# Patient Record
Sex: MALE | Race: OTHER | URBAN - METROPOLITAN AREA
[De-identification: names, ages, dates, MRNs, and addresses within clinical notes are randomized per-mention and may not be internally consistent; named-entity substitution may affect disease eponyms.]

---

## 2017-01-12 ENCOUNTER — OFFICE VISIT (OUTPATIENT)
Dept: OTOLARYNGOLOGY | Facility: CLINIC | Age: 37
End: 2017-01-12

## 2017-01-12 VITALS
BODY MASS INDEX: 19.7 KG/M2 | TEMPERATURE: 98 F | DIASTOLIC BLOOD PRESSURE: 86 MMHG | WEIGHT: 130 LBS | HEIGHT: 68 IN | SYSTOLIC BLOOD PRESSURE: 130 MMHG

## 2017-01-12 DIAGNOSIS — R42 DIZZINESS: Primary | ICD-10-CM

## 2017-01-12 PROCEDURE — 99212 OFFICE O/P EST SF 10 MIN: CPT | Performed by: OTOLARYNGOLOGY

## 2017-01-12 PROCEDURE — 99213 OFFICE O/P EST LOW 20 MIN: CPT | Performed by: OTOLARYNGOLOGY

## 2017-01-12 NOTE — PROGRESS NOTES
Savannah Springer is a 29year old male. Patient presents with:  Consult: imbalance for 5 years, worse in the last year     HPI:   He had dizziness abou 1-1/2 years ago. he had a VNG seem to demonstrate central process.   He had an MRI which was essentially no Right: Normal, Left: Normal.    Ears Normal Inspection - Right: Normal, Left: Normal. Canal - Left: Normal. TM - Right: Normal, Left: Normal.     ASSESSMENT AND PLAN:   1. Dizziness  Continued issues with balance. VNG showed central process.  Recommend neur

## 2017-01-12 NOTE — PATIENT INSTRUCTIONS
Dizziness (Vertigo) and Balance Problems: Diagnostic Tests    An otolaryngologist (also called an ENT) is a doctor who specializes in disorders of the ear, nose, and throat. Your ENT can help find clues to the cause of your dizziness.  He or she will exam

## 2017-05-04 ENCOUNTER — TELEPHONE (OUTPATIENT)
Dept: INTERNAL MEDICINE CLINIC | Facility: CLINIC | Age: 37
End: 2017-05-04

## 2017-05-10 NOTE — TELEPHONE ENCOUNTER
Not sure when last labs were done -- noted he was seen July 2016 -- not sure if he gets it from outside like [facility] -- will cc dr Pam Westbrook as he will be back next week and pts apt not until aug   Thank you

## 2017-06-03 ENCOUNTER — TELEPHONE (OUTPATIENT)
Dept: INTERNAL MEDICINE CLINIC | Facility: CLINIC | Age: 37
End: 2017-06-03

## 2017-06-03 DIAGNOSIS — Z00.00 PHYSICAL EXAM, ANNUAL: Primary | ICD-10-CM

## 2017-06-07 NOTE — TELEPHONE ENCOUNTER
Pt states he has used out side lab, and will send in older results. Pt advised orders have been approved.

## 2017-06-13 NOTE — TELEPHONE ENCOUNTER
Contacted pt and informed him that labs have been provided form him by [facility]. He stts that he usually gets labs done from [facility] that will draw labs at his workplace.

## 2017-07-25 ENCOUNTER — LAB ENCOUNTER (OUTPATIENT)
Dept: LAB | Age: 37
End: 2017-07-25
Attending: INTERNAL MEDICINE
Payer: COMMERCIAL

## 2017-07-25 DIAGNOSIS — Z00.00 PHYSICAL EXAM, ANNUAL: ICD-10-CM

## 2017-07-25 LAB
ALBUMIN SERPL BCP-MCNC: 4.6 G/DL (ref 3.5–4.8)
ALBUMIN/GLOB SERPL: 1.9 {RATIO} (ref 1–2)
ALP SERPL-CCNC: 37 U/L (ref 32–100)
ALT SERPL-CCNC: 16 U/L (ref 17–63)
ANION GAP SERPL CALC-SCNC: 6 MMOL/L (ref 0–18)
AST SERPL-CCNC: 19 U/L (ref 15–41)
BASOPHILS # BLD: 0.1 K/UL (ref 0–0.2)
BASOPHILS NFR BLD: 1 %
BILIRUB SERPL-MCNC: 0.6 MG/DL (ref 0.3–1.2)
BILIRUB UR QL: NEGATIVE
BUN SERPL-MCNC: 12 MG/DL (ref 8–20)
BUN/CREAT SERPL: 13 (ref 10–20)
CALCIUM SERPL-MCNC: 9.5 MG/DL (ref 8.5–10.5)
CHLORIDE SERPL-SCNC: 105 MMOL/L (ref 95–110)
CHOLEST SERPL-MCNC: 189 MG/DL (ref 110–200)
CLARITY UR: CLEAR
CO2 SERPL-SCNC: 28 MMOL/L (ref 22–32)
COLOR UR: YELLOW
CREAT SERPL-MCNC: 0.92 MG/DL (ref 0.5–1.5)
EOSINOPHIL # BLD: 0.1 K/UL (ref 0–0.7)
EOSINOPHIL NFR BLD: 3 %
ERYTHROCYTE [DISTWIDTH] IN BLOOD BY AUTOMATED COUNT: 13.2 % (ref 11–15)
GLOBULIN PLAS-MCNC: 2.4 G/DL (ref 2.5–3.7)
GLUCOSE SERPL-MCNC: 104 MG/DL (ref 70–99)
GLUCOSE UR-MCNC: NEGATIVE MG/DL
HCT VFR BLD AUTO: 44.2 % (ref 41–52)
HDLC SERPL-MCNC: 65 MG/DL
HGB BLD-MCNC: 15.1 G/DL (ref 13.5–17.5)
HGB UR QL STRIP.AUTO: NEGATIVE
KETONES UR-MCNC: NEGATIVE MG/DL
LDLC SERPL CALC-MCNC: 110 MG/DL (ref 0–99)
LEUKOCYTE ESTERASE UR QL STRIP.AUTO: NEGATIVE
LYMPHOCYTES # BLD: 1.8 K/UL (ref 1–4)
LYMPHOCYTES NFR BLD: 43 %
MCH RBC QN AUTO: 30.7 PG (ref 27–32)
MCHC RBC AUTO-ENTMCNC: 34.2 G/DL (ref 32–37)
MCV RBC AUTO: 89.8 FL (ref 80–100)
MONOCYTES # BLD: 0.3 K/UL (ref 0–1)
MONOCYTES NFR BLD: 8 %
NEUTROPHILS # BLD AUTO: 1.9 K/UL (ref 1.8–7.7)
NEUTROPHILS NFR BLD: 45 %
NITRITE UR QL STRIP.AUTO: NEGATIVE
NONHDLC SERPL-MCNC: 124 MG/DL
OSMOLALITY UR CALC.SUM OF ELEC: 288 MOSM/KG (ref 275–295)
PH UR: 6 [PH] (ref 5–8)
PLATELET # BLD AUTO: 197 K/UL (ref 140–400)
PMV BLD AUTO: 8.1 FL (ref 7.4–10.3)
POTASSIUM SERPL-SCNC: 4.3 MMOL/L (ref 3.3–5.1)
PROT SERPL-MCNC: 7 G/DL (ref 5.9–8.4)
PROT UR-MCNC: NEGATIVE MG/DL
RBC # BLD AUTO: 4.92 M/UL (ref 4.5–5.9)
SODIUM SERPL-SCNC: 139 MMOL/L (ref 136–144)
SP GR UR STRIP: 1.01 (ref 1–1.03)
TRIGL SERPL-MCNC: 72 MG/DL (ref 1–149)
TSH SERPL-ACNC: 1.39 UIU/ML (ref 0.45–5.33)
UROBILINOGEN UR STRIP-ACNC: <2
VIT C UR-MCNC: NEGATIVE MG/DL
WBC # BLD AUTO: 4.2 K/UL (ref 4–11)

## 2017-07-25 PROCEDURE — 81003 URINALYSIS AUTO W/O SCOPE: CPT

## 2017-07-25 PROCEDURE — 80061 LIPID PANEL: CPT

## 2017-07-25 PROCEDURE — 85025 COMPLETE CBC W/AUTO DIFF WBC: CPT

## 2017-07-25 PROCEDURE — 80053 COMPREHEN METABOLIC PANEL: CPT

## 2017-07-25 PROCEDURE — 36415 COLL VENOUS BLD VENIPUNCTURE: CPT

## 2017-07-25 PROCEDURE — 84443 ASSAY THYROID STIM HORMONE: CPT

## 2017-08-09 ENCOUNTER — OFFICE VISIT (OUTPATIENT)
Dept: INTERNAL MEDICINE CLINIC | Facility: CLINIC | Age: 37
End: 2017-08-09

## 2017-08-09 VITALS
WEIGHT: 129.5 LBS | HEART RATE: 53 BPM | SYSTOLIC BLOOD PRESSURE: 127 MMHG | DIASTOLIC BLOOD PRESSURE: 80 MMHG | HEIGHT: 68 IN | BODY MASS INDEX: 19.63 KG/M2

## 2017-08-09 DIAGNOSIS — Z00.00 PHYSICAL EXAM, ANNUAL: Primary | ICD-10-CM

## 2017-08-09 PROCEDURE — 99395 PREV VISIT EST AGE 18-39: CPT | Performed by: INTERNAL MEDICINE

## 2017-08-09 RX ORDER — SILDENAFIL 100 MG/1
100 TABLET, FILM COATED ORAL AS NEEDED
Qty: 10 TABLET | Refills: 1 | Status: SHIPPED | OUTPATIENT
Start: 2017-08-09 | End: 2017-08-16

## 2017-08-09 NOTE — PROGRESS NOTES
Delano Hodges is a 40year old male who presents for a complete physical exam.   HPI:   Pt complains of nothing. There is no immunization history on file for this patient.   Wt Readings from Last 6 Encounters:  08/09/17 : 129 lb 8 oz (58.7 kg)  01/12 abdominal pain,denies heartburn  : denies nocturia or changes in stream  MUSCULOSKELETAL: denies back pain  NEURO: denies headaches  PSYCHE: denies depression or anxiety  HEMATOLOGIC: denies hx of anemia  ENDOCRINE: denies thyroid history  ALL/ASTHMA: de

## 2017-08-12 ENCOUNTER — PATIENT MESSAGE (OUTPATIENT)
Dept: INTERNAL MEDICINE CLINIC | Facility: CLINIC | Age: 37
End: 2017-08-12

## 2017-08-16 RX ORDER — SILDENAFIL 100 MG/1
100 TABLET, FILM COATED ORAL
Qty: 40 TABLET | Refills: 0 | Status: SHIPPED | OUTPATIENT
Start: 2017-08-16 | End: 2018-10-03

## 2017-08-16 NOTE — PROGRESS NOTES
From: Catia Suarez  To: Janet Jaeger MD  Sent: 8/12/2017 4:32 PM CDT  Subject: Prescription Question    Dr. Moriah Lizama,    You wrote me a prescription when I saw you earlier this week.  It was a prescription for 10 tablets of 100mg Viagra with 1 r

## 2017-08-25 ENCOUNTER — TELEPHONE (OUTPATIENT)
Dept: FAMILY MEDICINE CLINIC | Facility: CLINIC | Age: 37
End: 2017-08-25

## 2018-10-03 ENCOUNTER — TELEPHONE (OUTPATIENT)
Dept: INTERNAL MEDICINE CLINIC | Facility: CLINIC | Age: 38
End: 2018-10-03

## 2018-10-03 ENCOUNTER — OFFICE VISIT (OUTPATIENT)
Dept: INTERNAL MEDICINE CLINIC | Facility: CLINIC | Age: 38
End: 2018-10-03
Payer: COMMERCIAL

## 2018-10-03 VITALS
SYSTOLIC BLOOD PRESSURE: 144 MMHG | HEART RATE: 76 BPM | DIASTOLIC BLOOD PRESSURE: 99 MMHG | WEIGHT: 130 LBS | BODY MASS INDEX: 19.7 KG/M2 | HEIGHT: 68 IN | RESPIRATION RATE: 16 BRPM

## 2018-10-03 DIAGNOSIS — Z00.00 PHYSICAL EXAM, ANNUAL: Primary | ICD-10-CM

## 2018-10-03 PROCEDURE — 99395 PREV VISIT EST AGE 18-39: CPT | Performed by: INTERNAL MEDICINE

## 2018-10-03 NOTE — PROGRESS NOTES
Allen Enriquez is a 41year old male who presents for a complete physical exam.   HPI:   Pt complains of nothing. 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 Is having testing done.      Immunization History  Administered            Date(s) Administered    Influenza lesions  EYES:denies blurred vision or double vision  HEENT: denies nasal congestion, sinus pain or ST  LUNGS: denies shortness of breath with exertion  CARDIOVASCULAR: denies chest pain on exertion  GI: denies abdominal pain,denies heartburn  : denies n

## 2018-10-03 NOTE — TELEPHONE ENCOUNTER
Pt called in stating that his package was delivered on 9/28 and signed by Wong Malik. I contacted the  who advised that TOM Reyjuan miguel Gagnononeil is not in the office but will return tomorrow. Please advise pt where he can  his package.

## 2018-10-05 RX ORDER — SILDENAFIL 100 MG/1
100 TABLET, FILM COATED ORAL
Qty: 40 TABLET | Refills: 0 | Status: SHIPPED | OUTPATIENT
Start: 2018-10-05 | End: 2018-10-15

## 2018-10-05 NOTE — TELEPHONE ENCOUNTER
Please see patient's message below and advise    Med pended for script printed    Please respond to pool: EM [facility] LPN/CMA    Office staff, please call patient when script ready for       To: EM RN TRIAGE      From: Bigg Benitez      Created: 10

## 2018-10-15 ENCOUNTER — TELEPHONE (OUTPATIENT)
Dept: INTERNAL MEDICINE CLINIC | Facility: CLINIC | Age: 38
End: 2018-10-15

## 2018-10-15 RX ORDER — SILDENAFIL 100 MG/1
100 TABLET, FILM COATED ORAL
Qty: 40 TABLET | Refills: 0 | Status: SHIPPED | OUTPATIENT
Start: 2018-10-15 | End: 2018-10-15

## 2018-10-15 RX ORDER — SILDENAFIL 100 MG/1
100 TABLET, FILM COATED ORAL
Qty: 40 TABLET | Refills: 0 | Status: SHIPPED | OUTPATIENT
Start: 2018-10-15 | End: 2019-08-20

## 2019-08-20 ENCOUNTER — OFFICE VISIT (OUTPATIENT)
Dept: INTERNAL MEDICINE CLINIC | Facility: CLINIC | Age: 39
End: 2019-08-20
Payer: COMMERCIAL

## 2019-08-20 VITALS
SYSTOLIC BLOOD PRESSURE: 134 MMHG | RESPIRATION RATE: 16 BRPM | DIASTOLIC BLOOD PRESSURE: 86 MMHG | HEIGHT: 68 IN | WEIGHT: 130 LBS | BODY MASS INDEX: 19.7 KG/M2 | HEART RATE: 52 BPM

## 2019-08-20 DIAGNOSIS — Z00.00 PHYSICAL EXAM, ANNUAL: Primary | ICD-10-CM

## 2019-08-20 DIAGNOSIS — G47.19 EXCESSIVE DAYTIME SLEEPINESS: ICD-10-CM

## 2019-08-20 DIAGNOSIS — R06.83 HABITUAL SNORING: ICD-10-CM

## 2019-08-20 PROCEDURE — 99395 PREV VISIT EST AGE 18-39: CPT | Performed by: INTERNAL MEDICINE

## 2019-08-20 RX ORDER — SILDENAFIL 100 MG/1
100 TABLET, FILM COATED ORAL
Qty: 40 TABLET | Refills: 0 | Status: SHIPPED | OUTPATIENT
Start: 2019-08-20 | End: 2021-05-19

## 2019-10-21 ENCOUNTER — PATIENT MESSAGE (OUTPATIENT)
Dept: INTERNAL MEDICINE CLINIC | Facility: CLINIC | Age: 39
End: 2019-10-21

## 2019-10-21 DIAGNOSIS — H00.11 CHALAZION OF RIGHT UPPER EYELID: Primary | ICD-10-CM

## 2019-10-22 ENCOUNTER — OFFICE VISIT (OUTPATIENT)
Dept: SLEEP CENTER | Age: 39
End: 2019-10-22
Attending: INTERNAL MEDICINE
Payer: COMMERCIAL

## 2019-10-22 DIAGNOSIS — G47.33 OSA (OBSTRUCTIVE SLEEP APNEA): Primary | ICD-10-CM

## 2019-10-22 PROCEDURE — 95806 SLEEP STUDY UNATT&RESP EFFT: CPT

## 2019-10-22 NOTE — TELEPHONE ENCOUNTER
From: Yohannes Elaine  To: Sharon Green MD  Sent: 10/21/2019 3:45 PM CDT  Subject: Referral Request    I have a chalazion which we discussed at my annual physical back in August. I’d like to see an opthamologist to get it addressed.  Can you refer m

## 2019-10-24 ENCOUNTER — OFFICE VISIT (OUTPATIENT)
Dept: OPHTHALMOLOGY | Facility: CLINIC | Age: 39
End: 2019-10-24
Payer: COMMERCIAL

## 2019-10-24 DIAGNOSIS — H00.14 CHALAZION OF LEFT UPPER EYELID: ICD-10-CM

## 2019-10-24 DIAGNOSIS — H00.11 CHALAZION RIGHT UPPER EYELID: Primary | ICD-10-CM

## 2019-10-24 PROCEDURE — 99212 OFFICE O/P EST SF 10 MIN: CPT | Performed by: OPHTHALMOLOGY

## 2019-10-24 PROCEDURE — 99202 OFFICE O/P NEW SF 15 MIN: CPT | Performed by: OPHTHALMOLOGY

## 2019-10-24 NOTE — PROGRESS NOTES
Vee Reynoso is a 46year old male. HPI:     HPI     Consult      Additional comments: Per Dr. Soco Kohli               Comments     Pt complains of multiple chalazia one on his his right upper lid and one on his left upper lid since June 2019.    Pt has b Acuity (Snellen - Linear)       Right Left    Dist sc 20/20 20/20 -2    Near sc 20/20 20/20          Pupils       Pupils    Right PERRL    Left PERRL            Slit Lamp and Fundus Exam     Slit Lamp Exam       Right Left    Lids/Lashes large chalazion RU

## 2019-10-24 NOTE — PATIENT INSTRUCTIONS
Chalazion right upper eyelid   Diagnosis discussed with patient. Chalazion info given. Told patient to use warm compresses 3-4 times per hour to try and have chalazion to open and drain.   Told patient that since this has been there for some time, it mo · Apply a warm, moist towel or compress for 10 to 15 minutes, 3 to 4 times a day. This will reduce the swelling and soften the hardened oils blocking the duct. · Massage the area gently after applying the warm compress to help drain the chalazion.  Or foll © 3395-2460 [employer] [address]. [address] [facility], [facility] [place]. All rights reserved. This information is not intended as a substitute for professional medical care. Always follow your healthcare professional's instructions.

## 2019-10-25 ENCOUNTER — PATIENT MESSAGE (OUTPATIENT)
Dept: INTERNAL MEDICINE CLINIC | Facility: CLINIC | Age: 39
End: 2019-10-25

## 2019-10-25 NOTE — TELEPHONE ENCOUNTER
[employer] message sent. From: Maura Goetz  To: Rudy Norris MD  Sent: 10/25/2019  7:46 AM CDT  Subject: Test Results Question    I had a sleep study earlier this week. Did you receive results?   How and when would you like to go over the resu

## 2019-10-25 NOTE — TELEPHONE ENCOUNTER
MyChart message sent. See other TE MyChart for the referral, already signed by Ciera Polanco.       From: Anthony Montana  To: Cass Heart MD  Sent: 10/25/2019  7:49 AM CDT  Subject: Referral Request    I saw Dr. Fontanez  per a previous referral. I

## 2019-10-28 NOTE — PROCEDURES
[facility]  Accredited by the [place] of Sleep Medicine (AASM)    PATIENT'S NAME: Kallie Teri: Claribel Dowling MD   REFERRING PHYSICIAN:    PATIENT ACCOUNT #: [de-identified] LOCATION: [address] [place] [address] position, equivalent to 40% of the testing. The average heart rate was 54 beats per minute. The maximum heart rate was 99 beats per minute.     INTERPRETATION:  The data generated from this study is consistent with mild obstructive sleep apnea (ICD-10 cod

## 2019-11-06 ENCOUNTER — OFFICE VISIT (OUTPATIENT)
Dept: OPHTHALMOLOGY | Facility: CLINIC | Age: 39
End: 2019-11-06
Payer: COMMERCIAL

## 2019-11-06 DIAGNOSIS — H00.11 CHALAZION RIGHT UPPER EYELID: Primary | ICD-10-CM

## 2019-11-06 PROCEDURE — 67800 REMOVE EYELID LESION: CPT | Performed by: OPHTHALMOLOGY

## 2019-11-06 NOTE — PATIENT INSTRUCTIONS
Chalazion right upper eyelid  Excision of right chalazion was done in the office today by Dr. MORGAN [facility] without complications. Erythromycin was applied and pressure patch was applied to the right eye  Patient can remove the pressure patch when he gets home.

## 2019-11-06 NOTE — PROGRESS NOTES
Parvez Osborn is a 46year old male. HPI:     HPI     Patient is here for a chalazion excision RUL.       Last edited by Casa Li OT on 11/6/2019  3:28 PM. (History)        Patient History:  Past Medical History:   Diagnosis Date   • Ankle fract No prescriptions requested or ordered in this encounter        Follow up instructions:  Return if symptoms worsen or fail to improve.     11/6/2019  Scribed by: Juan Carlos Sagastume MD

## 2019-11-06 NOTE — ASSESSMENT & PLAN NOTE
Excision of right chalazion was done in the office today by Dr. Sade Ortega without complications. Erythromycin was applied and pressure patch was applied to the right eye  Patient can remove the pressure patch when he gets home.   Patient should use cool comp

## 2020-01-22 ENCOUNTER — TELEPHONE (OUTPATIENT)
Dept: INTERNAL MEDICINE CLINIC | Facility: CLINIC | Age: 40
End: 2020-01-22

## 2020-01-22 NOTE — TELEPHONE ENCOUNTER
Patient requesting the order for the sleep study to be faxed to [phone] to [facility] center for sleep disorders, [address] in [place], [place],  please call when order has been faxed.

## 2020-01-23 NOTE — TELEPHONE ENCOUNTER
Delivery Read From Message Type Attachments Subject   1/22/2020  4:12 PM Karl Campos Patient Medical Advice Request  Sleep study order

## 2020-01-24 ENCOUNTER — PATIENT MESSAGE (OUTPATIENT)
Dept: INTERNAL MEDICINE CLINIC | Facility: CLINIC | Age: 40
End: 2020-01-24

## 2020-01-24 NOTE — TELEPHONE ENCOUNTER
From: Deysi Mejia  To: Alma Brantley MD  Sent: 1/24/2020 10:41 AM CST  Subject: Other    I'm sending a message regarding a sleep study and getting the needed information over to [facility] so that can schedule an appointment.      Could you ple

## 2020-01-24 NOTE — TELEPHONE ENCOUNTER
Clinical staff, can you please assist with mychart message?     Please respond to pool: EM IM [address] LPN/CMA

## 2020-01-30 ENCOUNTER — TELEPHONE (OUTPATIENT)
Dept: INTERNAL MEDICINE CLINIC | Facility: CLINIC | Age: 40
End: 2020-01-30

## 2020-01-30 NOTE — TELEPHONE ENCOUNTER
Cinthia from [facility] would like clinicals, sleep, and diagnostic  faxed over to # [phone].  Please advise

## 2020-01-31 ENCOUNTER — TELEPHONE (OUTPATIENT)
Dept: INTERNAL MEDICINE CLINIC | Facility: CLINIC | Age: 40
End: 2020-01-31

## 2020-01-31 DIAGNOSIS — G47.33 OSA (OBSTRUCTIVE SLEEP APNEA): Primary | ICD-10-CM

## 2020-01-31 NOTE — TELEPHONE ENCOUNTER
Deisi, patient is requesting sleep study to be entered as external since he is planning on completing through [address] [facility].      Please reply to pool: EM MANAGED CARE - MAIN

## 2020-01-31 NOTE — TELEPHONE ENCOUNTER
[facility] walked in with order from [facility]. Patients order indicates [id number] [address], please call at:[phone],thanks.

## 2020-01-31 NOTE — TELEPHONE ENCOUNTER
Titration study ordered by PCP's PA, Deisi Galindo. Order will need to be edited to \"external\" facility if pt is going through [address] [place] [address]

## 2020-02-21 ENCOUNTER — MED REC SCAN ONLY (OUTPATIENT)
Dept: INTERNAL MEDICINE CLINIC | Facility: CLINIC | Age: 40
End: 2020-02-21

## 2020-02-26 ENCOUNTER — TELEPHONE (OUTPATIENT)
Dept: INTERNAL MEDICINE CLINIC | Facility: CLINIC | Age: 40
End: 2020-02-26

## 2020-02-26 DIAGNOSIS — G47.33 OSA (OBSTRUCTIVE SLEEP APNEA): Primary | ICD-10-CM

## 2020-02-26 NOTE — TELEPHONE ENCOUNTER
Results received from [facility] for sleep study. Trial of a CPAP on 6cm H2O with a medium size Resmed full face mask mirage quattro mask and heated humidification recommended by physician.  He is advised to return to the sleep center 4 weeks after therapy for r

## 2020-02-28 NOTE — TELEPHONE ENCOUNTER
NATALIE Galindo can you please fax results on over to me so I can submit to [facility] [phone]. Thank you.     Please reply to pool: EM TRIAGE SUPPORT

## 2020-02-28 NOTE — TELEPHONE ENCOUNTER
Spoke with patient ( verified) and relayed GREG Galindo's message below--patient verbalizes understanding and agreement and will await further instruction RE DME approval and delivery. No further questions/concerns at this time.     Routed to triage support

## 2020-03-02 ENCOUNTER — MED REC SCAN ONLY (OUTPATIENT)
Dept: INTERNAL MEDICINE CLINIC | Facility: CLINIC | Age: 40
End: 2020-03-02

## 2020-03-04 ENCOUNTER — PATIENT MESSAGE (OUTPATIENT)
Dept: INTERNAL MEDICINE CLINIC | Facility: CLINIC | Age: 40
End: 2020-03-04

## 2020-03-05 ENCOUNTER — TELEPHONE (OUTPATIENT)
Dept: INTERNAL MEDICINE CLINIC | Facility: CLINIC | Age: 40
End: 2020-03-05

## 2020-03-05 NOTE — TELEPHONE ENCOUNTER
Trevor Quiroz from [facility] is calling because they need a second diagnosis added into notes, patient's AHI is only 5.9 and his insurance requires a second diagnosis if under 14.  The second diagnosis you can choose from is:    Excessive daytime sleepiness  Impaired cognition  Insomnia  Ischemic heart disease  History of stroke

## 2020-03-06 NOTE — TELEPHONE ENCOUNTER
Can we regenerate a DME order for this and add both YUMIKO and excessive daytime drowsiness and the diagnosis codes. I did speak to patient today and he does have daytime drowsiness.

## 2020-03-06 NOTE — TELEPHONE ENCOUNTER
Dr. Zahra Sepulveda, office notes from 08/20/2019 need to state additional diagnosis per Michael Morton at [facility]. Please addend notes. See message below.      Please reply to pool: EM TRIAGE SUPPORT

## 2020-03-12 NOTE — TELEPHONE ENCOUNTER
Cindy Baker MD 2 days ago         We spoke on Friday and you had asked me a few follow up questions about my CPAP order for insurance approval I believe. Do you have an update?           KIM Munoz Taher 8 days

## 2020-03-12 NOTE — TELEPHONE ENCOUNTER
From: Marcus Pina  To: Minerva Jewell MD  Sent: 3/4/2020 1:19 PM CST  Subject: Non-Urgent Medical Question    I'm following up about the recent sleep study I did at [facility] and the subsequent order for a CPAP machine. I do already have a mask.  I

## 2020-07-14 ENCOUNTER — OFFICE VISIT (OUTPATIENT)
Dept: PULMONOLOGY | Facility: CLINIC | Age: 40
End: 2020-07-14
Payer: COMMERCIAL

## 2020-07-14 VITALS
BODY MASS INDEX: 19.4 KG/M2 | WEIGHT: 131 LBS | DIASTOLIC BLOOD PRESSURE: 90 MMHG | HEIGHT: 69 IN | OXYGEN SATURATION: 98 % | SYSTOLIC BLOOD PRESSURE: 131 MMHG | HEART RATE: 55 BPM | RESPIRATION RATE: 18 BRPM

## 2020-07-14 DIAGNOSIS — G47.33 OSA (OBSTRUCTIVE SLEEP APNEA): Primary | ICD-10-CM

## 2020-07-14 PROCEDURE — 99204 OFFICE O/P NEW MOD 45 MIN: CPT | Performed by: INTERNAL MEDICINE

## 2020-07-14 PROCEDURE — 99212 OFFICE O/P EST SF 10 MIN: CPT | Performed by: INTERNAL MEDICINE

## 2020-07-14 NOTE — H&P
Referring Physician  Rudolph Lai MD    Chief Complaint  Sleep apnea    History of Present Illness  Patient is a 71-year-old male who presents from clinic for initial visit.   Patient with underlying history of recent polysomnography revealing evide chest imaging available to review    Pulmonary Function Testing  None    Assessment  1. Mild obstructive sleep apnea    Plan  -Patient seen today for management of underlying obstructive sleep apnea.   I reviewed his polysomnography results with initial di

## 2020-07-22 ENCOUNTER — TELEPHONE (OUTPATIENT)
Dept: PULMONOLOGY | Facility: CLINIC | Age: 40
End: 2020-07-22

## 2020-07-22 NOTE — TELEPHONE ENCOUNTER
Called [facility] [phone] spoke with Oseas Keyes prior auth for dme cpap supplies need cpt codes for prior auth     Please provide cpap cpt codes to initiate auth. Regarding- ref# [id number]    Process will take 14 business days.  She did state E is in network

## 2020-07-24 NOTE — TELEPHONE ENCOUNTER
Blanca- referral [id number] is for a change in CPAP pressure. If you need a CPT code for this referral, please reach out to Maciel Bartholomew. Our office does not supply CPT codes for HME. Their phone # is [phone]. Thanks.

## 2020-07-27 NOTE — TELEPHONE ENCOUNTER
Seward Goodell, RN; [facility] Clinical [facility]   Caller: Unspecified (5 days ago,  8:04 AM)             Good Morning,     Thanks for your response,     I will contact [facility] for cpt code and update referral once received.      [email or web address]

## 2020-07-29 NOTE — TELEPHONE ENCOUNTER
Called HME - for code- no code required- No authorization required per Africa Leonardo at [place] [id number] they will just make a service call to patient to change setting even though [facility] hmo plan.     [facility]

## 2020-08-02 ENCOUNTER — PATIENT MESSAGE (OUTPATIENT)
Dept: PULMONOLOGY | Facility: CLINIC | Age: 40
End: 2020-08-02

## 2020-08-02 DIAGNOSIS — G47.33 OSA (OBSTRUCTIVE SLEEP APNEA): Primary | ICD-10-CM

## 2020-08-03 NOTE — TELEPHONE ENCOUNTER
Please see Pt's MyChart message and advise. Thanks      From: Loki Mckeon  To:  Elsie Aguilar DO  Sent: 8/2/2020 11:42 AM CDT  Subject: Non-Urgent Medical Question    I have been using my CPAP for a little over 2 weeks since our consultation in July

## 2020-08-07 NOTE — TELEPHONE ENCOUNTER
Patient agreeable with plan to increase pressure setting. I have pended a DME order for the change. Please send to staff once sign and we can fax the order to DME. Thanks.

## 2020-08-11 ENCOUNTER — TELEPHONE (OUTPATIENT)
Dept: PULMONOLOGY | Facility: CLINIC | Age: 40
End: 2020-08-11

## 2020-08-11 NOTE — TELEPHONE ENCOUNTER
[facility] and MyChart message sent to patient. Fax confirmation received.

## 2020-08-11 NOTE — TELEPHONE ENCOUNTER
Left message to informed pt that DME order was made anf faxed to E. Provided HME phone nb. Fax confirmation received.

## 2020-09-01 ENCOUNTER — OFFICE VISIT (OUTPATIENT)
Dept: INTERNAL MEDICINE CLINIC | Facility: CLINIC | Age: 40
End: 2020-09-01
Payer: COMMERCIAL

## 2020-09-01 VITALS
HEART RATE: 48 BPM | SYSTOLIC BLOOD PRESSURE: 136 MMHG | HEIGHT: 68 IN | RESPIRATION RATE: 16 BRPM | DIASTOLIC BLOOD PRESSURE: 89 MMHG | WEIGHT: 130 LBS | BODY MASS INDEX: 19.7 KG/M2

## 2020-09-01 DIAGNOSIS — Z00.00 PHYSICAL EXAM, ANNUAL: Primary | ICD-10-CM

## 2020-09-01 DIAGNOSIS — G47.33 OBSTRUCTIVE SLEEP APNEA: ICD-10-CM

## 2020-09-01 PROCEDURE — 99395 PREV VISIT EST AGE 18-39: CPT | Performed by: INTERNAL MEDICINE

## 2020-09-01 PROCEDURE — 3008F BODY MASS INDEX DOCD: CPT | Performed by: INTERNAL MEDICINE

## 2020-09-01 PROCEDURE — 3079F DIAST BP 80-89 MM HG: CPT | Performed by: INTERNAL MEDICINE

## 2020-09-01 PROCEDURE — 3075F SYST BP GE 130 - 139MM HG: CPT | Performed by: INTERNAL MEDICINE

## 2020-09-01 NOTE — PROGRESS NOTES
Anthony Montana is a 45year old male who presents for a complete physical exam.   HPI:   Pt complains of less sleep apnea while wearring the CPAP machine.       Immunization History  Administered            Date(s) Administered    Influenza             11/ Smoker      Smokeless tobacco: Never Used    Alcohol use: No    Drug use: No     Occ:   : yes. Children: yes   Exercise: 6 times per week.   Diet: watches fats closely     REVIEW OF SYSTEMS:     GENERAL: feels well otherwise  SKIN: denies any Health maintenance, reviewed fasting Lipids, TSH, CMP and CBC . Labs are quite good. Pt referred for screening colonoscopy at 48 y/o.  Pt info discussed: exercise, low fat diet, testicular self exam and prostate cancer screening starting at 40 y/o.    2. O

## 2020-09-02 ENCOUNTER — MED REC SCAN ONLY (OUTPATIENT)
Dept: INTERNAL MEDICINE CLINIC | Facility: CLINIC | Age: 40
End: 2020-09-02

## 2020-09-17 ENCOUNTER — PATIENT MESSAGE (OUTPATIENT)
Dept: PULMONOLOGY | Facility: CLINIC | Age: 40
End: 2020-09-17

## 2020-09-18 NOTE — TELEPHONE ENCOUNTER
From: Adamaris Banda  To: Fabiola Bennett DO  Sent: 9/17/2020 11:18 AM CDT  Subject: Non-Urgent Medical Question    I'm sleeping better with the new pressure setting, but I have a very dry mouth in the morning. My CPAP has a humidifier.  The settings for

## 2020-12-16 ENCOUNTER — TELEPHONE (OUTPATIENT)
Dept: INTERNAL MEDICINE CLINIC | Facility: CLINIC | Age: 40
End: 2020-12-16

## 2020-12-16 NOTE — TELEPHONE ENCOUNTER
[employer] message sent to pt advising his to call office, under appt noted pt indicated \"cold feet\".

## 2020-12-16 NOTE — TELEPHONE ENCOUNTER
----- Message from Allen Enriquez sent at 12/16/2020  2:26 PM CST -----  Regarding: Non-Urgent Medical Question  Contact: [phone]  I scheduled a meeting with you next week in the office.  I'm realizing now that this may be an appropriate telemedicine

## 2020-12-23 ENCOUNTER — OFFICE VISIT (OUTPATIENT)
Dept: INTERNAL MEDICINE CLINIC | Facility: CLINIC | Age: 40
End: 2020-12-23
Payer: COMMERCIAL

## 2020-12-23 VITALS
SYSTOLIC BLOOD PRESSURE: 128 MMHG | HEART RATE: 53 BPM | HEIGHT: 68 IN | WEIGHT: 138 LBS | DIASTOLIC BLOOD PRESSURE: 74 MMHG | RESPIRATION RATE: 16 BRPM | BODY MASS INDEX: 20.92 KG/M2

## 2020-12-23 DIAGNOSIS — R20.9 BILATERAL COLD FEET: Primary | ICD-10-CM

## 2020-12-23 PROCEDURE — 3078F DIAST BP <80 MM HG: CPT | Performed by: INTERNAL MEDICINE

## 2020-12-23 PROCEDURE — 3008F BODY MASS INDEX DOCD: CPT | Performed by: INTERNAL MEDICINE

## 2020-12-23 PROCEDURE — 3074F SYST BP LT 130 MM HG: CPT | Performed by: INTERNAL MEDICINE

## 2020-12-23 PROCEDURE — 99213 OFFICE O/P EST LOW 20 MIN: CPT | Performed by: INTERNAL MEDICINE

## 2020-12-23 PROCEDURE — 99212 OFFICE O/P EST SF 10 MIN: CPT | Performed by: INTERNAL MEDICINE

## 2020-12-23 NOTE — PROGRESS NOTES
Delano Hodges is a 45year old male. HPI:     1. Bilateral cold feet    The patient complains of cold feet coming out of the last few months. He notes that he has to use either warm water or electric blanket to keep the feet warm.   He is able to jog 2 without bruit. ASSESSMENT AND PLAN:     1. Bilateral cold feet    There is no evidence of poor circulation on the patient.   His posterior tibial and dorsalis pedis pulses are normal.  His feet feel a little cold to touch but I think this is either mild

## 2021-05-19 ENCOUNTER — PATIENT MESSAGE (OUTPATIENT)
Dept: INTERNAL MEDICINE CLINIC | Facility: CLINIC | Age: 41
End: 2021-05-19

## 2021-05-19 ENCOUNTER — OFFICE VISIT (OUTPATIENT)
Dept: INTERNAL MEDICINE CLINIC | Facility: CLINIC | Age: 41
End: 2021-05-19
Payer: COMMERCIAL

## 2021-05-19 VITALS
SYSTOLIC BLOOD PRESSURE: 132 MMHG | WEIGHT: 134 LBS | BODY MASS INDEX: 20.31 KG/M2 | RESPIRATION RATE: 16 BRPM | DIASTOLIC BLOOD PRESSURE: 82 MMHG | HEIGHT: 68 IN

## 2021-05-19 DIAGNOSIS — Z00.00 PHYSICAL EXAM, ANNUAL: Primary | ICD-10-CM

## 2021-05-19 PROCEDURE — 99395 PREV VISIT EST AGE 18-39: CPT | Performed by: INTERNAL MEDICINE

## 2021-05-19 PROCEDURE — 3075F SYST BP GE 130 - 139MM HG: CPT | Performed by: INTERNAL MEDICINE

## 2021-05-19 PROCEDURE — 3079F DIAST BP 80-89 MM HG: CPT | Performed by: INTERNAL MEDICINE

## 2021-05-19 PROCEDURE — 3008F BODY MASS INDEX DOCD: CPT | Performed by: INTERNAL MEDICINE

## 2021-05-19 NOTE — TELEPHONE ENCOUNTER
Dr Todd Overton=see message below, pended script for printing. Office staff=please call patient once ready for .     From: Katherine Fox  To: Malini Le MD  Sent: 5/19/2021  1:16 PM CDT  Subject: Prescription Question    Good to see you

## 2021-05-19 NOTE — PROGRESS NOTES
Lenora Graves is a 45year old male who presents for a complete physical exam.   HPI:   Pt complains of less sleep apnea while wearring the CPAP machine.       Immunization History  Administered            Date(s) Administered    [employer] 3 Neg       Social History:  Social History    Tobacco Use      Smoking status: Never Smoker      Smokeless tobacco: Never Used    Alcohol use: No    Drug use: No     Occ:   : yes. Children: yes   Exercise: 6 times per week.   Diet: watches fats recommended low fat diet and aerobic exercise 30 minutes three times weekly. Health maintenance, reviewed fasting Lipids, TSH, CMP and CBC . Labs are quite good. Pt referred for screening colonoscopy at 50 y/o.  Pt info discussed: exercise, low fat diet, sade

## 2021-05-20 RX ORDER — SILDENAFIL 100 MG/1
100 TABLET, FILM COATED ORAL
Qty: 40 TABLET | Refills: 0 | Status: SHIPPED | OUTPATIENT
Start: 2021-05-20 | End: 2021-06-02

## 2021-09-02 ENCOUNTER — OFFICE VISIT (OUTPATIENT)
Dept: PULMONOLOGY | Facility: CLINIC | Age: 41
End: 2021-09-02
Payer: COMMERCIAL

## 2021-09-02 VITALS
BODY MASS INDEX: 19.85 KG/M2 | HEART RATE: 61 BPM | HEIGHT: 68 IN | WEIGHT: 131 LBS | OXYGEN SATURATION: 97 % | TEMPERATURE: 98 F | SYSTOLIC BLOOD PRESSURE: 127 MMHG | DIASTOLIC BLOOD PRESSURE: 81 MMHG | RESPIRATION RATE: 16 BRPM

## 2021-09-02 DIAGNOSIS — G47.33 OSA (OBSTRUCTIVE SLEEP APNEA): Primary | ICD-10-CM

## 2021-09-02 PROCEDURE — 99213 OFFICE O/P EST LOW 20 MIN: CPT | Performed by: INTERNAL MEDICINE

## 2021-09-02 PROCEDURE — 3079F DIAST BP 80-89 MM HG: CPT | Performed by: INTERNAL MEDICINE

## 2021-09-02 PROCEDURE — 3074F SYST BP LT 130 MM HG: CPT | Performed by: INTERNAL MEDICINE

## 2021-09-02 PROCEDURE — 3008F BODY MASS INDEX DOCD: CPT | Performed by: INTERNAL MEDICINE

## 2021-09-02 NOTE — PROGRESS NOTES
Referring Physician  Jennifer Schroeder MD    History of Present Illness  Patient seen today for follow-up visit for management of underlying obstructive sleep apnea. Patient using CPAP device on a nightly basis with improvement in hypersomnia and fatigue.

## 2021-12-06 ENCOUNTER — APPOINTMENT (OUTPATIENT)
Dept: URBAN - METROPOLITAN AREA CLINIC 321 | Age: 41
Setting detail: DERMATOLOGY
End: 2021-12-06

## 2021-12-06 DIAGNOSIS — L63.8 OTHER ALOPECIA AREATA: ICD-10-CM

## 2021-12-06 PROCEDURE — 99204 OFFICE O/P NEW MOD 45 MIN: CPT | Mod: 25

## 2021-12-06 PROCEDURE — OTHER COUNSELING: OTHER

## 2021-12-06 PROCEDURE — OTHER INTRALESIONAL KENALOG: OTHER

## 2021-12-06 PROCEDURE — OTHER PRESCRIPTION: OTHER

## 2021-12-06 PROCEDURE — 11900 INJECT SKIN LESIONS </W 7: CPT

## 2021-12-06 PROCEDURE — OTHER SEPARATE AND IDENTIFIABLE DOCUMENTATION: OTHER

## 2021-12-06 RX ORDER — TACROLIMUS 1 MG/G
OINTMENT TOPICAL
Qty: 60 | Refills: 1 | Status: ERX | COMMUNITY
Start: 2021-12-06

## 2021-12-06 ASSESSMENT — LOCATION DETAILED DESCRIPTION DERM
LOCATION DETAILED: RIGHT SUBMANDIBULAR AREA
LOCATION DETAILED: LEFT SUBMANDIBULAR AREA
LOCATION DETAILED: SUBMENTAL CHIN

## 2021-12-06 ASSESSMENT — LOCATION SIMPLE DESCRIPTION DERM
LOCATION SIMPLE: LEFT SUBMANDIBULAR AREA
LOCATION SIMPLE: SUBMENTAL CHIN
LOCATION SIMPLE: RIGHT SUBMANDIBULAR AREA

## 2021-12-06 ASSESSMENT — LOCATION ZONE DERM: LOCATION ZONE: FACE

## 2021-12-06 NOTE — PROCEDURE: INTRALESIONAL KENALOG
Consent: The risks of atrophy were reviewed with the patient. Discussed risk of hypopigmentation/skin thinning, as well as risk of infection, bleeding, scarring and need for repeat tx with pt.

## 2021-12-11 ENCOUNTER — PATIENT MESSAGE (OUTPATIENT)
Dept: PULMONOLOGY | Facility: CLINIC | Age: 41
End: 2021-12-11

## 2021-12-15 NOTE — TELEPHONE ENCOUNTER
patient states he don't like the cpap and there is no significant difference in his sleep with using the CPAP just the snoring. He wants recommendations in exploring other options. Please advise.

## 2021-12-15 NOTE — TELEPHONE ENCOUNTER
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 I took a couple weeks break. I don’t feel tired, or really any different than with the CPAP.  I feel l

## 2021-12-16 NOTE — TELEPHONE ENCOUNTER
Binta Rao, DO  [facility] Clinical Staff 4 hours ago (11:46 AM)         You may let the patient know other treatment options would include ENT evaluation to see if surgery may be an option versus being seen by dentist for an oral appliance which he

## 2021-12-21 ENCOUNTER — TELEPHONE (OUTPATIENT)
Dept: PULMONOLOGY | Facility: CLINIC | Age: 41
End: 2021-12-21

## 2021-12-27 NOTE — TELEPHONE ENCOUNTER
Spoke with patient informed him that pulmo office did not call him states everything is good on his end. Nothing further needed from pulmo office at this time.

## 2022-01-12 ENCOUNTER — APPOINTMENT (OUTPATIENT)
Dept: URBAN - METROPOLITAN AREA CLINIC 321 | Age: 42
Setting detail: DERMATOLOGY
End: 2022-01-12

## 2022-01-12 DIAGNOSIS — L63.8 OTHER ALOPECIA AREATA: ICD-10-CM

## 2022-01-12 PROCEDURE — OTHER PRESCRIPTION: OTHER

## 2022-01-12 PROCEDURE — 11900 INJECT SKIN LESIONS </W 7: CPT

## 2022-01-12 PROCEDURE — 99214 OFFICE O/P EST MOD 30 MIN: CPT | Mod: 25

## 2022-01-12 PROCEDURE — OTHER COUNSELING: OTHER

## 2022-01-12 PROCEDURE — OTHER INTRALESIONAL KENALOG: OTHER

## 2022-01-12 PROCEDURE — OTHER PRESCRIPTION MEDICATION MANAGEMENT: OTHER

## 2022-01-12 PROCEDURE — OTHER SEPARATE AND IDENTIFIABLE DOCUMENTATION: OTHER

## 2022-01-12 RX ORDER — TACROLIMUS 1 MG/G
OINTMENT TOPICAL
Qty: 60 | Refills: 1 | Status: ERX

## 2022-01-12 ASSESSMENT — LOCATION ZONE DERM: LOCATION ZONE: FACE

## 2022-01-12 ASSESSMENT — LOCATION SIMPLE DESCRIPTION DERM
LOCATION SIMPLE: SUBMENTAL CHIN
LOCATION SIMPLE: RIGHT SUBMANDIBULAR AREA
LOCATION SIMPLE: LEFT SUBMANDIBULAR AREA

## 2022-01-12 ASSESSMENT — LOCATION DETAILED DESCRIPTION DERM
LOCATION DETAILED: SUBMENTAL CHIN
LOCATION DETAILED: RIGHT SUBMANDIBULAR AREA
LOCATION DETAILED: LEFT SUBMANDIBULAR AREA

## 2022-01-12 NOTE — PROCEDURE: PRESCRIPTION MEDICATION MANAGEMENT
Detail Level: Zone
Render In Strict Bullet Format?: No
Plan: Was unable to  tacrolimus due to being too expensive will send out PC tacrolimus and  at our office.

## 2022-01-12 NOTE — PROCEDURE: INTRALESIONAL KENALOG
Pending Prescriptions:                       Disp   Refills    magic mouthwash (ENTER INGREDIENTS IN COM*120 mL 0            Sig: [ALUM/MAG           HYDROX-SIMETHICONE-DIPHENHYDRAMINE-LIDOCAINE           (MAGIC MOUTHWASH) SUSPENSION] Take 5 mL by mouth           3-4 times a day, as needed.  Swish around in           mouth and then spit out or swallow.       Treatment Number (Optional): 2

## 2022-01-12 NOTE — HPI: HAIR LOSS (ALOPECIA AREATA)
How Severe Is It?: mild
Is This A New Presentation, Or A Follow-Up?: Follow Up Alopecia Areata
unknown

## 2022-01-14 ENCOUNTER — MED REC SCAN ONLY (OUTPATIENT)
Dept: INTERNAL MEDICINE CLINIC | Facility: CLINIC | Age: 42
End: 2022-01-14

## 2022-02-23 ENCOUNTER — APPOINTMENT (OUTPATIENT)
Dept: URBAN - METROPOLITAN AREA CLINIC 321 | Age: 42
Setting detail: DERMATOLOGY
End: 2022-02-23

## 2022-02-23 DIAGNOSIS — L63.8 OTHER ALOPECIA AREATA: ICD-10-CM

## 2022-02-23 PROCEDURE — 11900 INJECT SKIN LESIONS </W 7: CPT

## 2022-02-23 PROCEDURE — OTHER PRESCRIPTION: OTHER

## 2022-02-23 PROCEDURE — OTHER INTRALESIONAL KENALOG: OTHER

## 2022-02-23 PROCEDURE — OTHER PRESCRIPTION MEDICATION MANAGEMENT: OTHER

## 2022-02-23 PROCEDURE — OTHER COUNSELING: OTHER

## 2022-02-23 RX ORDER — TACROLIMUS 1 MG/G
OINTMENT TOPICAL
Qty: 60 | Refills: 1 | Status: ACTIVE

## 2022-02-23 ASSESSMENT — LOCATION DETAILED DESCRIPTION DERM
LOCATION DETAILED: LEFT SUBMANDIBULAR AREA
LOCATION DETAILED: RIGHT SUBMANDIBULAR AREA

## 2022-02-23 ASSESSMENT — LOCATION SIMPLE DESCRIPTION DERM
LOCATION SIMPLE: LEFT SUBMANDIBULAR AREA
LOCATION SIMPLE: RIGHT SUBMANDIBULAR AREA

## 2022-02-23 ASSESSMENT — LOCATION ZONE DERM: LOCATION ZONE: FACE

## 2022-02-23 NOTE — HPI: HAIR LOSS
How Did The Hair Loss Occur?: gradual in onset
How Severe Is Your Hair Loss?: mild
Additional History: ILK inj done twice

## 2022-02-23 NOTE — PROCEDURE: INTRALESIONAL KENALOG
Include Z78.9 (Other Specified Conditions Influencing Health Status) As An Associated Diagnosis?: No
Concentration Of Solution Injected (Mg/Ml): 2.5
Treatment Number (Optional): 3
Medical Necessity Clause: This procedure was medically necessary because the lesions that were treated were:
Total Volume Injected (Ccs- Only Use Numbers And Decimals): 1
X Size Of Lesion In Cm (Optional): 0
Validate Note Data When Using Inventory: Yes
Kenalog Preparation: Kenalog with 1% lidocaine with epinephrine
Administered By (Optional): OK
Detail Level: Detailed
Consent: The risks of atrophy were reviewed with the patient. Discussed risk of hypopigmentation/skin thinning, as well as risk of infection, bleeding, scarring and need for repeat tx with pt.

## 2022-02-23 NOTE — PROCEDURE: PRESCRIPTION MEDICATION MANAGEMENT
Detail Level: Zone
Plan: Was unable to  tacrolimus due to being too expensive will send out PC tacrolimus and  at our office.
Render In Strict Bullet Format?: No
Initiate Treatment: Rogaine Foam 5%

## 2022-04-06 ENCOUNTER — APPOINTMENT (OUTPATIENT)
Dept: URBAN - METROPOLITAN AREA CLINIC 244 | Age: 42
Setting detail: DERMATOLOGY
End: 2022-04-06

## 2022-04-06 DIAGNOSIS — L63.8 OTHER ALOPECIA AREATA: ICD-10-CM

## 2022-04-06 PROCEDURE — OTHER PRESCRIPTION MEDICATION MANAGEMENT: OTHER

## 2022-04-06 PROCEDURE — OTHER COUNSELING: OTHER

## 2022-04-06 PROCEDURE — 99214 OFFICE O/P EST MOD 30 MIN: CPT

## 2022-04-06 ASSESSMENT — LOCATION DETAILED DESCRIPTION DERM
LOCATION DETAILED: RIGHT SUBMANDIBULAR AREA
LOCATION DETAILED: LEFT SUBMANDIBULAR AREA

## 2022-04-06 ASSESSMENT — LOCATION SIMPLE DESCRIPTION DERM
LOCATION SIMPLE: LEFT SUBMANDIBULAR AREA
LOCATION SIMPLE: RIGHT SUBMANDIBULAR AREA

## 2022-04-06 ASSESSMENT — LOCATION ZONE DERM: LOCATION ZONE: FACE

## 2022-05-04 ENCOUNTER — PATIENT MESSAGE (OUTPATIENT)
Dept: INTERNAL MEDICINE CLINIC | Facility: CLINIC | Age: 42
End: 2022-05-04

## 2022-05-04 NOTE — TELEPHONE ENCOUNTER
From: Dallas Castillo  Sent: 5/4/2022 4:49 PM CDT  To: Em Rn Triage  Subject: Dallas Castillo blood work 2022    I only have an electronic copy. Want me to print out and bring?

## 2022-05-04 NOTE — TELEPHONE ENCOUNTER
Please let him bring the hard copy of the results with him as well.   Thank you     looking forward to meeting him soon

## 2022-05-04 NOTE — TELEPHONE ENCOUNTER
Routed to Dr Liu Tobar for fyi, thanks.       Future Appointments   Date Time Provider Thu Miranda   6/10/2022  9:00 AM Ladi Toro MD [facility]

## 2022-06-10 ENCOUNTER — OFFICE VISIT (OUTPATIENT)
Dept: INTERNAL MEDICINE CLINIC | Facility: CLINIC | Age: 42
End: 2022-06-10
Payer: COMMERCIAL

## 2022-06-10 VITALS
HEIGHT: 68 IN | DIASTOLIC BLOOD PRESSURE: 76 MMHG | BODY MASS INDEX: 21.82 KG/M2 | SYSTOLIC BLOOD PRESSURE: 121 MMHG | WEIGHT: 144 LBS | HEART RATE: 65 BPM

## 2022-06-10 DIAGNOSIS — Z00.00 ADULT GENERAL MEDICAL EXAM: Primary | ICD-10-CM

## 2022-06-10 PROBLEM — Z15.89: Status: ACTIVE | Noted: 2022-06-10

## 2022-06-10 PROCEDURE — 3008F BODY MASS INDEX DOCD: CPT | Performed by: INTERNAL MEDICINE

## 2022-06-10 PROCEDURE — 3078F DIAST BP <80 MM HG: CPT | Performed by: INTERNAL MEDICINE

## 2022-06-10 PROCEDURE — 3074F SYST BP LT 130 MM HG: CPT | Performed by: INTERNAL MEDICINE

## 2022-06-10 PROCEDURE — 99396 PREV VISIT EST AGE 40-64: CPT | Performed by: INTERNAL MEDICINE

## 2022-10-05 ENCOUNTER — HOSPITAL ENCOUNTER (OUTPATIENT)
Dept: CT IMAGING | Facility: HOSPITAL | Age: 42
Discharge: HOME | End: 2022-10-05
Attending: INTERNAL MEDICINE

## 2022-10-05 DIAGNOSIS — Z13.6 SCREENING FOR CARDIOVASCULAR CONDITION: ICD-10-CM

## 2022-10-05 NOTE — PROGRESS NOTES
Date of Service 10/5/2022    Josy Castañeda  Date of Birth 7/3/1980    Patient Age: 40year old    PCP: Merary Marley MD  [address] [facility] [address]  [place] [address]    Consult Type  Type Scan/Screening: Heart Scan  Preliminary Heart Scan Score: 0                Body Mass Index  There is no height or weight on file to calculate BMI. Lipid Profile  No Lipid results on file in last 5 years . Nurse Review  Risk factor information and results reviewed with Nurse: Yes    Recommended Follow Up:  Consult your physician regarding[de-identified] Final Heart Scan Report; Discuss potential for Incidental Finding    No data recorded      Recommendations for Change:           Smoking Cessation: No Change Needed  Weight Management: Maintain Current Weight     Repeat Heart Scan: 5 years if Calcium Score is 0. 0; Discuss with your Physician          Luis Carlos Recommended Resources: Uyen Rutledge RN        Please Contact the Nurse Heart Line with any Questions or Concerns [phone].

## 2022-11-11 ENCOUNTER — OFFICE VISIT (OUTPATIENT)
Dept: PULMONOLOGY | Facility: CLINIC | Age: 42
End: 2022-11-11
Payer: COMMERCIAL

## 2022-11-11 VITALS
HEART RATE: 69 BPM | SYSTOLIC BLOOD PRESSURE: 119 MMHG | DIASTOLIC BLOOD PRESSURE: 73 MMHG | WEIGHT: 143 LBS | OXYGEN SATURATION: 97 % | BODY MASS INDEX: 22 KG/M2

## 2022-11-11 DIAGNOSIS — G47.33 OSA (OBSTRUCTIVE SLEEP APNEA): Primary | ICD-10-CM

## 2022-11-11 NOTE — PROGRESS NOTES
Referring Physician  Ronni Merrill MD    History of Present Illness  Patient seen today for follow-up visit for management of underlying obstructive sleep apnea. Proximately 1 year ago stopped using CPAP device. Has been using mouthguard with similar results. Denies significant hypersomnia or fatigue throughout the day. Normally awakens once or twice nightly to urinate at night. Denies significant difficulty falling asleep afterwards. No significant dyspnea symptoms. Medications  Sildenafil Citrate 100 MG Oral Tab, Take 1 tablet (100 mg total) by mouth daily as needed for Erectile Dysfunction. , Disp: 40 tablet, Rfl: 0    No current facility-administered medications on file prior to visit. Allergies  No Known Allergies    Physical Exam  Constitutional: no acute distress  HEENT: PERRL  Neck: supple, no JVD  Cardio: RRR, S1 S2  Respiratory: clear to auscultation bilaterally, no wheezing, rales, rhonchi, crackles  GI: abdomen soft, non tender, active bowel sounds, no organomegaly  Extremities: no clubbing, cyanosis, edema  Neurologic: no gross motor deficits  Skin: warm, dry  Lymphatic: no supraclavicular lymphadenopathy     Assessment  1. Obstructive sleep apnea    Plan  -Patient seen today for management of underlying obstructive sleep apnea. Prior polysomnography with mild YUMIKO. Tolerating mouthguard with similar results. Okay to use mouthguard from pulmonary perspective. Okay to hold off using CPAP moving forward.     Fox Ariza DO  Pulmonary Medicine  [id number] [facility]

## 2022-12-05 ENCOUNTER — MED REC SCAN ONLY (OUTPATIENT)
Dept: INTERNAL MEDICINE CLINIC | Facility: CLINIC | Age: 42
End: 2022-12-05

## 2023-04-27 NOTE — TELEPHONE ENCOUNTER
Form palced on [facility] Matthias [facility]imaldo [facility]
Pt is calling state that he fax over a px form that need to be completed by Dr Rushing July pt want to know if the form was received state that he fax it to [phone]
Spoke to patient and notifed him that his form has been completed and faxed
20-Apr-2023

## 2023-05-31 LAB
AMB EXT BILIRUBIN, TOTAL: 0.6 MG/DL
AMB EXT BUN: 16 MG/DL
AMB EXT CALCIUM: 9.6
AMB EXT CHOL/HDL RATIO: 2.9
AMB EXT CHOLESTEROL, TOTAL: 213 MG/DL
AMB EXT CMP ALT: 17 U/L
AMB EXT CMP AST: 19 U/L
AMB EXT CREATININE: 0.97 MG/DL
AMB EXT EGFR NON-AA: 101
AMB EXT GLUCOSE: 89 MG/DL
AMB EXT HDL CHOLESTEROL: 74 MG/DL
AMB EXT HEMATOCRIT: 46.5
AMB EXT HEMOGLOBIN: 15.5
AMB EXT LDL CHOLESTEROL, DIRECT: 123 MG/DL
AMB EXT MCV: 91.7
AMB EXT NON HDL CHOL: 139 MG/DL
AMB EXT PLATELETS: 238
AMB EXT POSTASSIUM: 4.3 MMOL/L
AMB EXT SODIUM: 138 MMOL/L
AMB EXT TOTAL PROTEIN: 7.2
AMB EXT TRIGLYCERIDES: 68 MG/DL
AMB EXT TSH: 1.2 MIU/ML
AMB EXT WBC: 4.2 X10(3)UL

## 2023-06-05 ENCOUNTER — PATIENT MESSAGE (OUTPATIENT)
Dept: INTERNAL MEDICINE CLINIC | Facility: CLINIC | Age: 43
End: 2023-06-05

## 2023-06-07 ENCOUNTER — MED REC SCAN ONLY (OUTPATIENT)
Dept: INTERNAL MEDICINE CLINIC | Facility: CLINIC | Age: 43
End: 2023-06-07

## 2023-06-26 ENCOUNTER — OFFICE VISIT (OUTPATIENT)
Dept: INTERNAL MEDICINE CLINIC | Facility: CLINIC | Age: 43
End: 2023-06-26

## 2023-06-26 VITALS
SYSTOLIC BLOOD PRESSURE: 112 MMHG | BODY MASS INDEX: 20.16 KG/M2 | WEIGHT: 133 LBS | RESPIRATION RATE: 14 BRPM | DIASTOLIC BLOOD PRESSURE: 80 MMHG | HEART RATE: 62 BPM | HEIGHT: 68 IN | OXYGEN SATURATION: 97 %

## 2023-06-26 DIAGNOSIS — Z00.00 ADULT GENERAL MEDICAL EXAM: Primary | ICD-10-CM

## 2023-06-26 PROCEDURE — 3074F SYST BP LT 130 MM HG: CPT | Performed by: INTERNAL MEDICINE

## 2023-06-26 PROCEDURE — 3079F DIAST BP 80-89 MM HG: CPT | Performed by: INTERNAL MEDICINE

## 2023-06-26 PROCEDURE — 99396 PREV VISIT EST AGE 40-64: CPT | Performed by: INTERNAL MEDICINE

## 2023-06-26 PROCEDURE — 3008F BODY MASS INDEX DOCD: CPT | Performed by: INTERNAL MEDICINE

## 2024-03-05 ENCOUNTER — PATIENT MESSAGE (OUTPATIENT)
Dept: INTERNAL MEDICINE CLINIC | Facility: CLINIC | Age: 44
End: 2024-03-05

## 2024-03-05 DIAGNOSIS — Z00.00 ADULT GENERAL MEDICAL EXAM: Primary | ICD-10-CM

## 2024-03-06 NOTE — TELEPHONE ENCOUNTER
From: Cleveland Vick  To: Matthew Harrison  Sent: 3/5/2024 10:05 AM CST  Subject: Bloodwork in advance of 4/22/24 visit    I have my annual physical exam on 4/22/2024. I’d like to get bloodwork in advance to discuss results as appropriate. Can you please write me paperwork as needed to get regular bloodwork done in advance. I’d like work done that’s covered 100% by insurance. I have a new job. In years past I had gotten my bloodwork done at [facility]. I’m not tied to [employer] in any way. I’d like to be able to view my bloodwork history if possible.

## 2024-04-23 LAB
ALBUMIN/GLOBULIN RATIO: 1.8 (CALC) (ref 1–2.5)
ALBUMIN: 4.6 G/DL (ref 3.6–5.1)
ALKALINE PHOSPHATASE: 48 U/L (ref 36–130)
ALT: 37 U/L (ref 9–46)
AST: 54 U/L (ref 10–40)
BILIRUBIN, TOTAL: 0.7 MG/DL (ref 0.2–1.2)
BUN: 15 MG/DL (ref 7–25)
CALCIUM: 10 MG/DL (ref 8.6–10.3)
CARBON DIOXIDE: 31 MMOL/L (ref 20–32)
CHLORIDE: 100 MMOL/L (ref 98–110)
CHOL/HDLC RATIO: 3.2 (CALC)
CHOLESTEROL, TOTAL: 220 MG/DL
CREATININE: 1.11 MG/DL (ref 0.6–1.29)
EGFR: 85 ML/MIN/1.73M2
GLOBULIN: 2.6 G/DL (CALC) (ref 1.9–3.7)
GLUCOSE: 91 MG/DL (ref 65–99)
HDL CHOLESTEROL: 69 MG/DL
HEMATOCRIT: 44.7 % (ref 38.5–50)
HEMOGLOBIN A1C: 5.9 % OF TOTAL HGB
HEMOGLOBIN: 15.1 G/DL (ref 13.2–17.1)
LDL-CHOLESTEROL: 137 MG/DL (CALC)
MCH: 29.8 PG (ref 27–33)
MCHC: 33.8 G/DL (ref 32–36)
MCV: 88.2 FL (ref 80–100)
MPV: 9.8 FL (ref 7.5–12.5)
NON-HDL CHOLESTEROL: 151 MG/DL (CALC)
PLATELET COUNT: 245 THOUSAND/UL (ref 140–400)
POTASSIUM: 4.5 MMOL/L (ref 3.5–5.3)
PROTEIN, TOTAL: 7.2 G/DL (ref 6.1–8.1)
RDW: 13.1 % (ref 11–15)
RED BLOOD CELL COUNT: 5.07 MILLION/UL (ref 4.2–5.8)
SODIUM: 139 MMOL/L (ref 135–146)
TRIGLYCERIDES: 58 MG/DL
TSH W/REFLEX TO FT4: 1.26 MIU/L (ref 0.4–4.5)
WHITE BLOOD CELL COUNT: 4.1 THOUSAND/UL (ref 3.8–10.8)

## 2024-05-20 ENCOUNTER — OFFICE VISIT (OUTPATIENT)
Dept: INTERNAL MEDICINE CLINIC | Facility: CLINIC | Age: 44
End: 2024-05-20

## 2024-05-20 VITALS
DIASTOLIC BLOOD PRESSURE: 80 MMHG | HEART RATE: 57 BPM | HEIGHT: 69 IN | SYSTOLIC BLOOD PRESSURE: 129 MMHG | BODY MASS INDEX: 21.05 KG/M2 | WEIGHT: 142.13 LBS | OXYGEN SATURATION: 97 %

## 2024-05-20 DIAGNOSIS — Z00.00 ADULT GENERAL MEDICAL EXAM: Primary | ICD-10-CM

## 2024-05-20 DIAGNOSIS — R35.89 POLYURIA: ICD-10-CM

## 2024-05-20 NOTE — PROGRESS NOTES
Cleveland Vick is a 43 year old male.  Chief Complaint   Patient presents with    Physical     HPI:   49-year-old gentleman here for physical.  He report that he is doing well.  He admits that he drinks a lot of fluids however, he noticed polyuria as well.  He does not think this is from his fluid intake.  Denies any weight loss.  Denies any dysuria hesitancy dribbling or stream problems.    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     He is not allergic to any medication.     He does not smoke, denies alcohol or recreational drug abuse.     Family history-his father had CABG at the age of 74.  He denies any prostate cancer or colon cancer in the family.     He is  and he has 2 kids. Works as an       Current Outpatient Medications   Medication Sig Dispense Refill    Sildenafil Citrate 100 MG Oral Tab Take 1 tablet (100 mg total) by mouth daily as needed for Erectile Dysfunction. 40 tablet 0      Past Medical History:    Ankle fracture    Casting 6 weeks    Chalazion of left upper eyelid    Chalazion right upper eyelid    Nasal septal deviation    Physical exam, annual    Routine general medical examination at a health care facility      Past Surgical History:   Procedure Laterality Date    Excision of chalazion, single - od - right eye Right 11/06/2019    RUL    Eye surgery  2008    Prk - od - right eye Right 2005    Prk - os - left eye Left 2005      Social History:  Social History     Socioeconomic History    Marital status:    Tobacco Use    Smoking status: Never    Smokeless tobacco: Never   Substance and Sexual Activity    Alcohol use: No    Drug use: No   Other Topics Concern    Caffeine Concern Yes     Comment: 1 cup daily      Family History   Problem Relation Age of Onset    Hypertension Father 72    Heart Disorder Father         CABG x 4    Other (cabg x 4) Father      Hypertension Mother 70    Stroke Mother 65        CVA    Other (Other) Brother         sleep apnea    Hypertension Brother     Diabetes Neg     Glaucoma Neg     Macular degeneration Neg       No Known Allergies     REVIEW OF SYSTEMS:   Review of Systems   Review of Systems   Constitutional: Negative for activity change, appetite change and fever.   HENT: Negative for congestion and voice change.    Respiratory: Negative for cough and shortness of breath.    Cardiovascular: Negative for chest pain.   Gastrointestinal: Negative for abdominal distention, abdominal pain and vomiting.   Genitourinary: Negative for hematuria.   Skin: Negative for wound.   Psychiatric/Behavioral: Negative for behavioral problems.   Wt Readings from Last 5 Encounters:   05/20/24 142 lb 2 oz (64.5 kg)   06/26/23 133 lb (60.3 kg)   11/11/22 143 lb (64.9 kg)   06/10/22 144 lb (65.3 kg)   09/02/21 131 lb (59.4 kg)     Body mass index is 20.99 kg/m².      EXAM:   /80 (BP Location: Right arm, Patient Position: Sitting)   Pulse 57   Ht 5' 9\" (1.753 m)   Wt 142 lb 2 oz (64.5 kg)   SpO2 97%   BMI 20.99 kg/m²   Physical Exam   Constitutional:       Appearance: Normal appearance.   HENT:      Head: Normocephalic.   Eyes:      Conjunctiva/sclera: Conjunctivae normal.   Cardiovascular:      Rate and Rhythm: Normal rate and regular rhythm.      Heart sounds: Normal heart sounds. No murmur heard.  Pulmonary:      Effort: Pulmonary effort is normal.      Breath sounds: Normal breath sounds. No rhonchi or rales.   Abdominal:      General: Bowel sounds are normal.      Palpations: Abdomen is soft.      Tenderness: There is no abdominal tenderness.   Musculoskeletal:      Cervical back: Neck supple.      Right lower leg: No edema.      Left lower leg: No edema.   Skin:     General: Skin is warm and dry.   Neurological:      General: No focal deficit present.      Mental Status: He is alert and oriented to person, place, and time. Mental status is  at baseline.   Psychiatric:         Mood and Affect: Mood normal.         Behavior: Behavior normal.       ASSESSMENT AND PLAN:   1. Adult general medical exam  Encouraged patient to eat healthy.  Fruits and vegetables, exercise regularly.  We have discussed regarding statins.  My recommendation is to hold statins for now and try diet and exercise.  If there is no improvement, can consider statins.  Will continue to monitor hemoglobin A1c.      Polyuria -diabetes is ruled out.  This is most likely from excessive fluid intake.  He would like to get another opinion from urology.  - Urology Referral - [facility] ([facility])    Plan: As above.      The patient indicates understanding of these issues and agrees to the plan.  No follow-ups on file.    This note was prepared using Dragon Medical voice recognition dictation software. As a result errors may occur. When identified these errors have been corrected. While every attempt is made to correct errors during dictation discrepancies may still exist.

## 2024-05-31 ENCOUNTER — LAB ENCOUNTER (OUTPATIENT)
Dept: LAB | Facility: HOSPITAL | Age: 44
End: 2024-05-31
Attending: UROLOGY
Payer: COMMERCIAL

## 2024-05-31 ENCOUNTER — OFFICE VISIT (OUTPATIENT)
Dept: SURGERY | Facility: CLINIC | Age: 44
End: 2024-05-31

## 2024-05-31 VITALS — SYSTOLIC BLOOD PRESSURE: 122 MMHG | HEART RATE: 71 BPM | DIASTOLIC BLOOD PRESSURE: 78 MMHG

## 2024-05-31 DIAGNOSIS — N13.8 BPH WITH OBSTRUCTION/LOWER URINARY TRACT SYMPTOMS: Primary | ICD-10-CM

## 2024-05-31 DIAGNOSIS — N40.1 BPH WITH OBSTRUCTION/LOWER URINARY TRACT SYMPTOMS: Primary | ICD-10-CM

## 2024-05-31 LAB
BILIRUB UR QL: NEGATIVE
CLARITY UR: CLEAR
COLOR UR: COLORLESS
GLUCOSE UR-MCNC: NORMAL MG/DL
HGB UR QL STRIP.AUTO: NEGATIVE
KETONES UR-MCNC: NEGATIVE MG/DL
LEUKOCYTE ESTERASE UR QL STRIP.AUTO: NEGATIVE
NITRITE UR QL STRIP.AUTO: NEGATIVE
PH UR: 6.5 [PH] (ref 5–8)
PROT UR-MCNC: NEGATIVE MG/DL
PSA SERPL-MCNC: 0.98 NG/ML (ref ?–4)
SP GR UR STRIP: <1.005 (ref 1–1.03)
UROBILINOGEN UR STRIP-ACNC: NORMAL

## 2024-05-31 PROCEDURE — 99244 OFF/OP CNSLTJ NEW/EST MOD 40: CPT | Performed by: UROLOGY

## 2024-05-31 PROCEDURE — 3074F SYST BP LT 130 MM HG: CPT | Performed by: UROLOGY

## 2024-05-31 PROCEDURE — 84153 ASSAY OF PSA TOTAL: CPT | Performed by: UROLOGY

## 2024-05-31 PROCEDURE — 3078F DIAST BP <80 MM HG: CPT | Performed by: UROLOGY

## 2024-05-31 PROCEDURE — 36415 COLL VENOUS BLD VENIPUNCTURE: CPT | Performed by: UROLOGY

## 2024-05-31 PROCEDURE — 81003 URINALYSIS AUTO W/O SCOPE: CPT | Performed by: UROLOGY

## 2024-05-31 RX ORDER — TADALAFIL 5 MG/1
5 TABLET ORAL
Qty: 90 TABLET | Refills: 3 | Status: SHIPPED | OUTPATIENT
Start: 2024-05-31

## 2024-05-31 NOTE — PROGRESS NOTES
SUBJECTIVE:  Cleveland Vick is a 43 year old male who presents for a consultation at the request of, and a copy of this note will be sent to, Matthew Mclaughlin, for evaluation of  benign prostatic hyperplasia and erectile dysfunction. He states that the problem is unchanged. Symptoms include 1 to 2-year history of progressive lower urinary tract symptoms with an IPSS score of 10 quality-of-life index of 5.  Most bothered by frequency and urgency during the day.  He reports nocturia x 1.  He admits to drinking three quarters of a gallon of water but he states he exercises vigorously for 1 hour every day.  He seldom has any caffeine and he does not drink alcohol.  He denies constipation.  No dysuria or gross hematuria.  No recent urologic labs.  Has had a chronic history of erectile dysfunction.  Uses sildenafil 50 mg as needed with good control of symptoms..  He reports normal libido.  He denies any other complaints.    Allergies: No Known Allergies    History:  Past Medical History:    Ankle fracture    Casting 6 weeks    Chalazion of left upper eyelid    Chalazion right upper eyelid    Nasal septal deviation    Physical exam, annual    Routine general medical examination at a health care facility      Past Surgical History:   Procedure Laterality Date    Excision of chalazion, single - od - right eye Right 11/06/2019    RUL    Eye surgery  2008    Prk - od - right eye Right 2005    Prk - os - left eye Left 2005      Family History   Problem Relation Age of Onset    Hypertension Father 72    Heart Disorder Father         CABG x 4    Other (cabg x 4) Father     Hypertension Mother 70    Stroke Mother 65        CVA    Other (Other) Brother         sleep apnea    Hypertension Brother     Diabetes Neg     Glaucoma Neg     Macular degeneration Neg       Social History:   Social History     Socioeconomic History    Marital status:    Tobacco Use    Smoking status: Never    Smokeless tobacco: Never    Substance and Sexual Activity    Alcohol use: No    Drug use: No   Other Topics Concern    Caffeine Concern Yes     Comment: 1 cup daily            REVIEW OF SYSTEMS:  RESPIRATORY:  Negative for chest tightness, wheezing, cough, shortness of breath,  sputum production,chest pain or pleurisy.  CARDIOVASCULAR:  Negative for pain or chest discomfort, dizziness or fainting, palpitations, leg swelling, nocturia, or claudication.  GASTROINTESTINAL:  Negative for nausea, vomiting, diarrhea, constipation, heartburn or indigestion, abdominal pains, bloody or tarry stools.  GENERAL: Denies:  weight gain, weight loss, fever, night sweats, bone pain, malaise, and fatigue. Positive for:  none.  All other review of systems reviewed and otherwise negative    OBJECTIVE:  /78 (BP Location: Left arm, Patient Position: Sitting, Cuff Size: adult)   Pulse 71   He appears well, in no apparent distress.  Alert and oriented times three, pleasant and cooperative.  CARDIOVASCULAR:normal apical impulse  RESPIRATORY: no chest wall deformities or tenderness  ABDOMEN: abdomen is soft without significant tenderness, masses, organomegaly or guarding  GENITOURINARY:      Penis: no penile lesions or discharge. Meatus normal location and size.      Scrotum: normal in appearance      Right Epididymis and Vas: both are palpably normal.      Right Testis: normal        Left Epididymis and Vas: both are palpably normal.      Left Testis: normal        Inguinal Lymph Nodes: non-palpable both  There is a left direct inguinal hernia about 3 cm in size.  Not tender to palpation.  Patient states has been present for at least 2 to 3 years.  I suggest that he discuss with his primary care physician.      Prostate: prostate smooth and symmetric without tenderness or nodules, enlarged, 40 grams       Rectal: normal tone, no masses  Skin exam grossly normal  Intact neurologically grossly    ASSESSMENT/PLAN  Encounter Diagnosis   Name Primary?    BPH with  obstruction/lower urinary tract symptoms Yes       Orders Placed This Encounter   Procedures    Urinalysis, Routine    PSA Diagnostic [E]     Recommend:  - We will start him empirically on tadalafil 5 mg daily.  Side effects reviewed.  - Uroflow bladder scan for postvoid residual volume in 6 weeks.  - Urinalysis and PSA to be done today.  - Discussed with his primary care physician findings of a left inguinal hernia.  Meds This Visit:  Requested Prescriptions     Signed Prescriptions Disp Refills    tadalafil 5 MG Oral Tab 90 tablet 3     Sig: Take 1 tablet (5 mg total) by mouth daily as needed.       Imaging & Referrals:  None

## 2024-06-04 ENCOUNTER — PATIENT MESSAGE (OUTPATIENT)
Dept: SURGERY | Facility: CLINIC | Age: 44
End: 2024-06-04

## 2024-06-10 ENCOUNTER — TELEPHONE (OUTPATIENT)
Dept: SURGERY | Facility: CLINIC | Age: 44
End: 2024-06-10

## 2024-06-10 ENCOUNTER — LAB ENCOUNTER (OUTPATIENT)
Dept: LAB | Facility: HOSPITAL | Age: 44
End: 2024-06-10
Attending: UROLOGY
Payer: COMMERCIAL

## 2024-06-10 DIAGNOSIS — R30.0 DYSURIA: Primary | ICD-10-CM

## 2024-06-10 LAB
BILIRUB UR QL: NEGATIVE
COLOR UR: YELLOW
GLUCOSE UR-MCNC: NORMAL MG/DL
KETONES UR-MCNC: NEGATIVE MG/DL
LEUKOCYTE ESTERASE UR QL STRIP.AUTO: 500
NITRITE UR QL STRIP.AUTO: NEGATIVE
PH UR: 6 [PH] (ref 5–8)
PROT UR-MCNC: 50 MG/DL
RBC #/AREA URNS AUTO: >10 /HPF
SP GR UR STRIP: 1.01 (ref 1–1.03)
UROBILINOGEN UR STRIP-ACNC: NORMAL
WBC #/AREA URNS AUTO: >50 /HPF
WBC CLUMPS UR QL AUTO: PRESENT /HPF

## 2024-06-10 PROCEDURE — 87186 SC STD MICRODIL/AGAR DIL: CPT | Performed by: UROLOGY

## 2024-06-10 PROCEDURE — 87077 CULTURE AEROBIC IDENTIFY: CPT | Performed by: UROLOGY

## 2024-06-10 PROCEDURE — 81001 URINALYSIS AUTO W/SCOPE: CPT | Performed by: UROLOGY

## 2024-06-10 PROCEDURE — 87086 URINE CULTURE/COLONY COUNT: CPT | Performed by: UROLOGY

## 2024-06-10 NOTE — TELEPHONE ENCOUNTER
I s/w pt and determined that he has persistent symptoms of some dark red/orange colored urine alternating with yellow urine, for the past 5-6 days on and off with accompanying urinary frequency, and discomfort while urinating and feeling like he is not emptying with a feeling of pressure when he tries to urinate and only small amts of urine is produced. Denies bladder fullness or pressure and states he feels he is emp[tying when he goes. Denies burning with urination fever and chills or cloudy fouls smelling urine. Last UA on 5/31 was negative. I told pt that I will place new orders for UA and C&S and we will call with results.             LOV 5/31/24  ASSESSMENT/PLAN       Encounter Diagnosis   Name Primary?    BPH with obstruction/lower urinary tract symptoms Yes             Orders Placed This Encounter   Procedures    Urinalysis, Routine    PSA Diagnostic [E]      Recommend:  - We will start him empirically on tadalafil 5 mg daily.  Side effects reviewed.  - Uroflow bladder scan for postvoid residual volume in 6 weeks.  - Urinalysis and PSA to be done today.  - Discussed with his primary care physician findings of a left inguinal hernia.

## 2024-06-10 NOTE — TELEPHONE ENCOUNTER
Patient calling stating he got the results and would like to know if a prescription will be send to his pharmacy.Please advise

## 2024-06-10 NOTE — TELEPHONE ENCOUNTER
I called the patient and confirmed his full name and . I explained to the patient that while we have the urinalysis results back, we are still waiting for the urine culture and sensitivities, which will show us what organism there is and how to treat it. I told the patient that I will check in the morning for the results.    I asked the patient about his symptoms and he stated that they have mostly passed. I asked him to call us if his symptoms get worse or he is having more discomfort and fever.    Pt verbalized his understanding.

## 2024-06-11 ENCOUNTER — TELEPHONE (OUTPATIENT)
Dept: SURGERY | Facility: CLINIC | Age: 44
End: 2024-06-11

## 2024-06-11 RX ORDER — SULFAMETHOXAZOLE AND TRIMETHOPRIM 800; 160 MG/1; MG/1
1 TABLET ORAL 2 TIMES DAILY
Qty: 14 TABLET | Refills: 0 | Status: SHIPPED | OUTPATIENT
Start: 2024-06-11 | End: 2024-06-18

## 2024-06-11 RX ORDER — SULFAMETHOXAZOLE AND TRIMETHOPRIM 800; 160 MG/1; MG/1
1 TABLET ORAL 2 TIMES DAILY
Qty: 14 TABLET | Refills: 0 | Status: SHIPPED | OUTPATIENT
Start: 2024-06-11 | End: 2024-06-11

## 2024-06-11 NOTE — TELEPHONE ENCOUNTER
----- Message from Ameena Oconnell sent at 6/11/2024  9:56 AM CDT -----  Urology staff,  Please contact this patient.  Let him know that his urine culture is growing bacteria.  Final speciation and sensitivities are still pending.  Nursing staff may send a prescription for Bactrim double strength 1 tab p.o. twice daily for 7 days.  Inform him we may need to change the antibiotics based on final results.

## 2024-06-11 NOTE — TELEPHONE ENCOUNTER
I called the patient and confirmed his full name and . I read him Dr. Oconnell's message. Pt asked whether I could change his preferred pharmacy to [facility], which I did. I cancelled the prescription at [facility] and reordered Bactrim DS at [facility].    I told the patient that we may call him to change the antibiotics if the sensitivities show a better antibiotic.    Pt verbalized his understanding.

## 2024-07-02 ENCOUNTER — OFFICE VISIT (OUTPATIENT)
Dept: SURGERY | Facility: CLINIC | Age: 44
End: 2024-07-02
Payer: COMMERCIAL

## 2024-07-02 DIAGNOSIS — N40.1 BPH WITH OBSTRUCTION/LOWER URINARY TRACT SYMPTOMS: Primary | ICD-10-CM

## 2024-07-02 DIAGNOSIS — N13.8 BPH WITH OBSTRUCTION/LOWER URINARY TRACT SYMPTOMS: Primary | ICD-10-CM

## 2024-07-02 DIAGNOSIS — N39.0 RECURRENT UTI: ICD-10-CM

## 2024-07-02 DIAGNOSIS — R30.0 DYSURIA: ICD-10-CM

## 2024-07-02 PROCEDURE — 51798 US URINE CAPACITY MEASURE: CPT | Performed by: UROLOGY

## 2024-07-02 PROCEDURE — 51741 ELECTRO-UROFLOWMETRY FIRST: CPT | Performed by: UROLOGY

## 2024-07-02 PROCEDURE — 99213 OFFICE O/P EST LOW 20 MIN: CPT | Performed by: UROLOGY

## 2024-07-02 NOTE — PROGRESS NOTES
Cleveland Vick is a 43 year old male.    HPI:     Chief Complaint   Patient presents with    BPH     Uroflow bladder scan       43-year-old male in follow-up to visit May 31, 2024 for BPH, lower urinary tract symptoms, erectile dysfunction.  I started the patient on tadalafil 5 mg daily.  IPSS score at last visit 10 quality-of-life index 5.  Mostly bothered by frequency urgency.  He presents today for uroflow and bladder scan for postvoid residual volume.  He reports a 25 to 30% improvement overall in symptoms in terms of both his urination issues and erectile dysfunction on this medication.  His urinalysis at the time of his last visit was unremarkable but he called the office complaining of some dysuria and gross hematuria on Herminia 10.  Urinalysis showed microhematuria.  Urine culture did grow greater than 100,000 colonies of Klebsiella pneumonia.  He was prescribed Bactrim as per our culture results and he completed the course and states his symptoms have since resolved.      HISTORY:  Past Medical History:    Ankle fracture    Casting 6 weeks    Chalazion of left upper eyelid    Chalazion right upper eyelid    Nasal septal deviation    Physical exam, annual    Routine general medical examination at a health care facility      Past Surgical History:   Procedure Laterality Date    Excision of chalazion, single - od - right eye Right 11/06/2019    RUL    Eye surgery  2008    Prk - od - right eye Right 2005    Prk - os - left eye Left 2005      Family History   Problem Relation Age of Onset    Hypertension Father 72    Heart Disorder Father         CABG x 4    Other (cabg x 4) Father     Hypertension Mother 70    Stroke Mother 65        CVA    Other (Other) Brother         sleep apnea    Hypertension Brother     Diabetes Neg     Glaucoma Neg     Macular degeneration Neg       Social History:   Social History     Socioeconomic History    Marital status:    Tobacco Use    Smoking status: Never     Smokeless tobacco: Never   Substance and Sexual Activity    Alcohol use: No    Drug use: No   Other Topics Concern    Caffeine Concern Yes     Comment: 1 cup daily        Medications (Active prior to today's visit):  Current Outpatient Medications   Medication Sig Dispense Refill    tadalafil 5 MG Oral Tab Take 1 tablet (5 mg total) by mouth daily as needed. 90 tablet 3    Sildenafil Citrate 100 MG Oral Tab Take 1 tablet (100 mg total) by mouth daily as needed for Erectile Dysfunction. 40 tablet 0       Allergies:  No Known Allergies      ROS:       PHYSICAL EXAM:   Voided Volume: 145 mL    Maximum flow rate: 9.9 mL/s    Average flow rate: 5.9 mL/s    Voiding curve Shape: Normal    Bladder US for post void residual volume: 171 mL      Conclusion of study: Study demonstrates diminished flow rates and elevated postvoid residual volume consistent with bladder outlet obstruction.       ASSESSMENT/PLAN:   Assessment   Encounter Diagnoses   Name Primary?    BPH with obstruction/lower urinary tract symptoms Yes    Dysuria      Recommend:  -Renal bladder ultrasound to rule out upper tract hydronephrosis or stones given his recent UTI.  - Continue on tadalafil 5 mg daily.  - I discussed alternative options given his minimal improvement in symptoms and we agreed to proceed with cystoscopy and consideration of procedural options to manage his urination complaints.  A transrectal ultrasound will be done at that time as well.  - Brochures for [employer] and greenlight laser vaporization were provided for his home review.  - My office provided him with contact information for our general surgeons and consideration for hernia repair.         Orders This Visit:  No orders of the defined types were placed in this encounter.      Meds This Visit:  Requested Prescriptions      No prescriptions requested or ordered in this encounter       Imaging & Referrals:  None     7/2/2024  Ameena Oconnell MD

## 2024-07-03 ENCOUNTER — TELEPHONE (OUTPATIENT)
Dept: PHYSICAL THERAPY | Facility: HOSPITAL | Age: 44
End: 2024-07-03

## 2024-07-03 ENCOUNTER — PATIENT MESSAGE (OUTPATIENT)
Dept: INTERNAL MEDICINE CLINIC | Facility: CLINIC | Age: 44
End: 2024-07-03

## 2024-07-03 DIAGNOSIS — Z15.89: Primary | ICD-10-CM

## 2024-07-05 NOTE — TELEPHONE ENCOUNTER
Sue Hernandez, RN 7/5/2024 9:02 AM CDT        ----- Message -----  From: Cleveland Vick  Sent: 7/4/2024 11:35 AM CDT  To: [facility]  Subject: Order for speech therapy request     I have not. I’d like to avoid spending his time to have him just tell me to see a speech therapist. I’m having issues. I wouldn’t characterize them as serious. I’ll see Dr. Harrison and inform him that I would like to see a speech therapist. That would basically be the entire visit. I’d prefer not to have extra visits if not absolutely necessary.

## 2024-07-25 ENCOUNTER — TELEPHONE (OUTPATIENT)
Dept: SURGERY | Facility: CLINIC | Age: 44
End: 2024-07-25

## 2024-07-25 NOTE — TELEPHONE ENCOUNTER
Patient has procedure on 7/25 and patient states he lost the paper that advises him on how to prepare for procedure. Patient is requesting a call back with this information or to have the paper sent to him via "[employer] message. Please advise.

## 2024-07-25 NOTE — TELEPHONE ENCOUNTER
Spoke with pt and went over pre-procedure instructions and advised pt to arrive 30 min early. Pt verbalized understanding.

## 2024-07-26 ENCOUNTER — HOSPITAL ENCOUNTER (OUTPATIENT)
Dept: ULTRASOUND IMAGING | Facility: HOSPITAL | Age: 44
Discharge: HOME | End: 2024-07-26
Attending: UROLOGY
Payer: COMMERCIAL

## 2024-07-26 ENCOUNTER — PROCEDURE (OUTPATIENT)
Dept: SURGERY | Facility: CLINIC | Age: 44
End: 2024-07-26
Payer: COMMERCIAL

## 2024-07-26 VITALS — SYSTOLIC BLOOD PRESSURE: 119 MMHG | HEART RATE: 69 BPM | DIASTOLIC BLOOD PRESSURE: 82 MMHG | RESPIRATION RATE: 16 BRPM

## 2024-07-26 DIAGNOSIS — N40.1 BPH WITH OBSTRUCTION/LOWER URINARY TRACT SYMPTOMS: Primary | ICD-10-CM

## 2024-07-26 DIAGNOSIS — N39.0 RECURRENT UTI: ICD-10-CM

## 2024-07-26 DIAGNOSIS — N13.8 BPH WITH OBSTRUCTION/LOWER URINARY TRACT SYMPTOMS: Primary | ICD-10-CM

## 2024-07-26 DIAGNOSIS — N13.8 BPH WITH OBSTRUCTION/LOWER URINARY TRACT SYMPTOMS: ICD-10-CM

## 2024-07-26 DIAGNOSIS — R30.0 DYSURIA: ICD-10-CM

## 2024-07-26 DIAGNOSIS — N40.1 BPH WITH OBSTRUCTION/LOWER URINARY TRACT SYMPTOMS: ICD-10-CM

## 2024-07-26 PROCEDURE — 3074F SYST BP LT 130 MM HG: CPT | Performed by: UROLOGY

## 2024-07-26 PROCEDURE — 52000 CYSTOURETHROSCOPY: CPT | Performed by: UROLOGY

## 2024-07-26 PROCEDURE — 99213 OFFICE O/P EST LOW 20 MIN: CPT | Performed by: UROLOGY

## 2024-07-26 PROCEDURE — 76770 US EXAM ABDO BACK WALL COMP: CPT | Performed by: UROLOGY

## 2024-07-26 PROCEDURE — 3079F DIAST BP 80-89 MM HG: CPT | Performed by: UROLOGY

## 2024-07-26 RX ORDER — CIPROFLOXACIN 500 MG/1
500 TABLET, FILM COATED ORAL ONCE
Status: COMPLETED | OUTPATIENT
Start: 2024-07-26 | End: 2024-07-26

## 2024-07-26 RX ADMIN — CIPROFLOXACIN 500 MG: 500 TABLET, FILM COATED ORAL at 09:45:00

## 2024-07-26 NOTE — PROGRESS NOTES
Cleveland Vick is a 44 year old male.    HPI:     Chief Complaint   Patient presents with    Procedure     Cystoscopy       44-year-old male presents for office cystoscopy in follow-up to visit July 2, 2024.  Has a history of BPH, lower urinary tract symptoms and erectile dysfunction currently well-controlled on tadalafil 5 mg daily.  He was diagnosed with a urinary tract infection and treated appropriately with Bactrim.  Scheduled for a renal bladder ultrasound later this afternoon to rule out hydronephrosis or kidney stones.  He presents today for cystoscopy in anticipation of surgical management options.      HISTORY:  Past Medical History:    Ankle fracture    Casting 6 weeks    Chalazion of left upper eyelid    Chalazion right upper eyelid    Nasal septal deviation    Physical exam, annual    Routine general medical examination at a health care facility      Past Surgical History:   Procedure Laterality Date    Excision of chalazion, single - od - right eye Right 11/06/2019    RUL    Eye surgery  2008    Prk - od - right eye Right 2005    Prk - os - left eye Left 2005      Family History   Problem Relation Age of Onset    Hypertension Father 72    Heart Disorder Father         CABG x 4    Other (cabg x 4) Father     Hypertension Mother 70    Stroke Mother 65        CVA    Other (Other) Brother         sleep apnea    Hypertension Brother     Diabetes Neg     Glaucoma Neg     Macular degeneration Neg       Social History:   Social History     Socioeconomic History    Marital status:    Tobacco Use    Smoking status: Never    Smokeless tobacco: Never   Substance and Sexual Activity    Alcohol use: No    Drug use: No   Other Topics Concern    Caffeine Concern Yes     Comment: 1 cup daily        Medications (Active prior to today's visit):  Current Outpatient Medications   Medication Sig Dispense Refill    tadalafil 5 MG Oral Tab Take 1 tablet (5 mg total) by mouth daily as needed. 90 tablet 3     Sildenafil Citrate 100 MG Oral Tab Take 1 tablet (100 mg total) by mouth daily as needed for Erectile Dysfunction. 40 tablet 0       Allergies:  No Known Allergies      ROS:       PHYSICAL EXAM:   Cleveland Vick  : 7/3/1980  Referring Physician: No ref. provider found     Chief Complaint   Patient presents with    Procedure     Cystoscopy           CYSTOSCOPY    Anesthesia:  2% lidocaine gel    Urethra: Normal  Prostate / Pelvic: Normal  Bladder: Normal.  No tumor, stone, diverticulum, or glomerulation  U.O's: Normal  Trabeculation: none      POST CYSTOSCOPY MEDICATIONS: sample one tablet Cipro 500mg given to patient    DIAGNOSIS:     PLAN: see below       ASSESSMENT/PLAN:   Assessment   Encounter Diagnoses   Name Primary?    BPH with obstruction/lower urinary tract symptoms Yes    Recurrent UTI        Reviewed findings at length with patient.  Recommended observation for the time being.  He will continue on tadalafil 5 mg daily.  I asked him to return to clinic otherwise in 6 months to reassess symptoms.  Follow-up on renal bladder ultrasound once it is done.         Orders This Visit:  No orders of the defined types were placed in this encounter.      Meds This Visit:  Requested Prescriptions      No prescriptions requested or ordered in this encounter       Imaging & Referrals:  None     2024  Ameena Oconnell MD

## 2024-08-07 DIAGNOSIS — R31.0 GROSS HEMATURIA: ICD-10-CM

## 2024-08-07 DIAGNOSIS — N13.39 OTHER HYDRONEPHROSIS: Primary | ICD-10-CM

## 2024-08-07 NOTE — PROGRESS NOTES
I called and spoke with patient. Discussed results of US. Discussed hydronephrosis finding. Advised CT urogram with follow up with Dr. Oconnell. The patient is in [place] now, but will complete upon his return.   Rocío Atkinson PA-C

## 2024-08-08 ENCOUNTER — PATIENT MESSAGE (OUTPATIENT)
Dept: SURGERY | Facility: CLINIC | Age: 44
End: 2024-08-08

## 2024-08-13 ENCOUNTER — OFFICE VISIT (OUTPATIENT)
Dept: SURGERY | Facility: CLINIC | Age: 44
End: 2024-08-13
Payer: COMMERCIAL

## 2024-08-13 VITALS — BODY MASS INDEX: 21 KG/M2 | WEIGHT: 142 LBS

## 2024-08-13 DIAGNOSIS — K40.90 NON-RECURRENT UNILATERAL INGUINAL HERNIA WITHOUT OBSTRUCTION OR GANGRENE: Primary | ICD-10-CM

## 2024-08-13 PROCEDURE — 99204 OFFICE O/P NEW MOD 45 MIN: CPT | Performed by: SURGERY

## 2024-08-13 NOTE — H&P
HPI:    Patient ID: Cleveland Vick is a 44 year old male presenting with   Chief Complaint   Patient presents with    Hernia     Patient states he has a lump in the left groin for the past 10 years.  When he saw Dr. Oconnell, he was told to have the hernia fixed.  Denies pain, urinary problems.   .    Hernia        Past Medical History:    Ankle fracture    Casting 6 weeks    Chalazion of left upper eyelid    Chalazion right upper eyelid    Nasal septal deviation    Physical exam, annual    Routine general medical examination at a health care facility     Past Surgical History:   Procedure Laterality Date    Excision of chalazion, single - od - right eye Right 11/06/2019    RUL    Eye surgery  2008    Prk - od - right eye Right 2005    Prk - os - left eye Left 2005     Family History   Problem Relation Age of Onset    Hypertension Father 72    Heart Disorder Father         CABG x 4    Other (cabg x 4) Father     Hypertension Mother 70    Stroke Mother 65        CVA    Other (Other) Brother         sleep apnea    Hypertension Brother     Diabetes Neg     Glaucoma Neg     Macular degeneration Neg      Social History     Socioeconomic History    Marital status:      Spouse name: Not on file    Number of children: Not on file    Years of education: Not on file    Highest education level: Not on file   Occupational History    Not on file   Tobacco Use    Smoking status: Never    Smokeless tobacco: Never   Substance and Sexual Activity    Alcohol use: No    Drug use: No    Sexual activity: Not on file   Other Topics Concern     Service Not Asked    Blood Transfusions Not Asked    Caffeine Concern Yes     Comment: 1 cup daily    Occupational Exposure Not Asked    Hobby Hazards Not Asked    Sleep Concern Not Asked    Stress Concern Not Asked    Weight Concern Not Asked    Special Diet Not Asked    Back Care Not Asked    Exercise Not Asked    Bike Helmet Not Asked    Seat Belt Not Asked    Self-Exams Not  Asked   Social History Narrative    Not on file     Social Determinants of Health     Financial Resource Strain: Not on file   Food Insecurity: Not on file   Transportation Needs: Not on file   Physical Activity: Not on file   Stress: Not on file   Social Connections: Not on file   Housing Stability: Not on file       Review of Systems   Constitutional: Negative.    HENT: Negative.     Eyes: Negative.    Respiratory: Negative.     Cardiovascular: Negative.    Gastrointestinal: Negative.         Left groin bulge   Endocrine: Negative.    Genitourinary: Negative.    Musculoskeletal: Negative.    Skin: Negative.    Allergic/Immunologic: Negative.    Neurological: Negative.    Hematological:  Does not bruise/bleed easily.   Psychiatric/Behavioral: Negative.             Current Outpatient Medications   Medication Sig Dispense Refill    tadalafil 5 MG Oral Tab Take 1 tablet (5 mg total) by mouth daily as needed. 90 tablet 3    Sildenafil Citrate 100 MG Oral Tab Take 1 tablet (100 mg total) by mouth daily as needed for Erectile Dysfunction. 40 tablet 0       Allergies:No Known Allergies   PHYSICAL EXAM:   Wt 142 lb (64.4 kg)   BMI 20.97 kg/m²   Physical Exam  Vitals reviewed.   Constitutional:       Appearance: Normal appearance. He is well-developed.   HENT:      Head: Normocephalic and atraumatic.   Cardiovascular:      Rate and Rhythm: Normal rate and regular rhythm.   Pulmonary:      Effort: Pulmonary effort is normal.      Breath sounds: Normal breath sounds.   Abdominal:      General: There is no distension.      Palpations: Abdomen is soft. There is no mass.      Tenderness: There is no abdominal tenderness. There is no guarding or rebound.      Hernia: A hernia is present. Hernia is present in the left inguinal area. There is no hernia in the right inguinal area.          Comments: Small reducible left groin bulge   Musculoskeletal:         General: Normal range of motion.      Cervical back: Normal range of  motion and neck supple.   Skin:     General: Skin is warm and dry.   Neurological:      Mental Status: He is alert and oriented to person, place, and time.   Psychiatric:         Speech: Speech normal.         Behavior: Behavior normal.                 ASSESSMENT/PLAN:   Diagnoses and all orders for this visit:    Non-recurrent unilateral inguinal hernia without obstruction or gangrene    Asymptomatic small reducible left inguinal hernia.  Discussed the nature of inguinal hernias and rationale for surgical repair.  In light of lack of symptoms, recommend continued observation.  Low benefit low risk scenario.         Alverto Cordova MD  8/13/2024

## 2024-08-20 NOTE — TELEPHONE ENCOUNTER
I called and spoke with the patient regarding his MyChart message regarding his CT urogram.  Discussed that renal ultrasound showed left-sided hydronephrosis.  Discussed that a CT urogram would help us visualize the kidneys and ureters better.  The patient had many questions about follow-up.  Reports the soonest he can be seen with  in October.  I advised that he keep that appointment pending the results of the CT urogram we will touch base as to whether or not he should follow-up sooner or keep the October appointment.  In July when he saw Dr. Oconnell last, doctor Oconnell advised follow-up in 6 months to evaluate how patient is doing on tadalafil 5 mg daily for BPH.  So regardless of CT urogram findings patient does need to follow-up with    Well questions answered to the best of my ability.

## 2024-08-21 ENCOUNTER — HOSPITAL ENCOUNTER (OUTPATIENT)
Dept: CT IMAGING | Facility: HOSPITAL | Age: 44
Discharge: HOME | End: 2024-08-21
Attending: PHYSICIAN ASSISTANT
Payer: COMMERCIAL

## 2024-08-21 DIAGNOSIS — N13.39 OTHER HYDRONEPHROSIS: ICD-10-CM

## 2024-08-21 DIAGNOSIS — R31.0 GROSS HEMATURIA: ICD-10-CM

## 2024-08-21 LAB
CREAT BLD-MCNC: 1.1 MG/DL
EGFRCR SERPLBLD CKD-EPI 2021: 85 ML/MIN/1.73M2 (ref 60–?)

## 2024-08-21 PROCEDURE — 82565 ASSAY OF CREATININE: CPT

## 2024-08-21 PROCEDURE — 74178 CT ABD&PLV WO CNTR FLWD CNTR: CPT | Performed by: PHYSICIAN ASSISTANT

## 2024-08-21 PROCEDURE — 76377 3D RENDER W/INTRP POSTPROCES: CPT | Performed by: PHYSICIAN ASSISTANT

## 2024-08-21 NOTE — PROGRESS NOTES
Problem: Knowledge Deficit  Goal: Patient/family/caregiver demonstrates understanding of disease process, treatment plan, medications, and discharge instructions  Description: Complete learning assessment and assess knowledge base.  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 Discussed with Dr. Oconnell. Ok for follow up in November as scheduled.  Angry

## 2024-08-23 ENCOUNTER — PATIENT MESSAGE (OUTPATIENT)
Dept: SURGERY | Facility: CLINIC | Age: 44
End: 2024-08-23

## 2024-08-26 ENCOUNTER — APPOINTMENT (OUTPATIENT)
Dept: SPEECH THERAPY | Facility: HOSPITAL | Age: 44
End: 2024-08-26
Attending: INTERNAL MEDICINE
Payer: COMMERCIAL

## 2024-08-27 ENCOUNTER — TELEPHONE (OUTPATIENT)
Dept: PHYSICAL THERAPY | Facility: HOSPITAL | Age: 44
End: 2024-08-27

## 2024-08-30 ENCOUNTER — ORDER TRANSCRIPTION (OUTPATIENT)
Dept: PHYSICAL THERAPY | Facility: HOSPITAL | Age: 44
End: 2024-08-30

## 2024-08-30 DIAGNOSIS — R47.89 DYSFLUENCY: Primary | ICD-10-CM

## 2024-08-30 DIAGNOSIS — Z15.89: ICD-10-CM

## 2024-09-03 ENCOUNTER — TELEPHONE (OUTPATIENT)
Dept: PHYSICAL THERAPY | Facility: HOSPITAL | Age: 44
End: 2024-09-03

## 2024-09-05 ENCOUNTER — TELEPHONE (OUTPATIENT)
Dept: PHYSICAL THERAPY | Facility: HOSPITAL | Age: 44
End: 2024-09-05

## 2024-09-05 NOTE — TELEPHONE ENCOUNTER
From: Cleveland Vick  Sent: 8/27/2024 10:33 AM CDT  To: [facility] Clinical Staff  Subject: CT Urogram follow up     Just wanted to follow up on this.

## 2024-09-05 NOTE — TELEPHONE ENCOUNTER
MCM sent and seen by pt    Alvarez Mr. 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 Nothing that needs to be acted on. Keep your follow up with Dr. Oconnell as scheduled. He will discuss results with you in more detail at your visit.  Rocío Atkinson PA-C  August 21, 2024   Written by Rocío Atkinson PA-C on 8/21/2024  2:25 PM CDT  Seen by patient Cleveland Vick on 8/28/2024  8:07 AM

## 2024-09-09 ENCOUNTER — OFFICE VISIT (OUTPATIENT)
Dept: SPEECH THERAPY | Facility: HOSPITAL | Age: 44
End: 2024-09-09
Attending: INTERNAL MEDICINE
Payer: COMMERCIAL

## 2024-09-09 DIAGNOSIS — R47.89 DYSFLUENCY: Primary | ICD-10-CM

## 2024-09-09 DIAGNOSIS — Z15.89: ICD-10-CM

## 2024-09-09 PROCEDURE — 92523 SPEECH SOUND LANG COMPREHEN: CPT

## 2024-09-09 NOTE — PROGRESS NOTES
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  Pt was diagnosed with Biallelic Mutation of SPG7 Gene.  He reports worsening symptoms related to his speech clarity over past several months, however, he also states that his family has noted worsening intelligibility over several years.  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 In addition, Pt reports that it takes effort to speak clearly and that friends and family frequently ask for repetition.       Pain: 0/10  Hospital/Rehab course: N/A  Current functional limitations include: reduced intelligibility.   Cleveland describes prior level of function Independent in all iADL's/ADL's. Pt goals include learn strategies to improve overall speech intelligibility.    ASSESSMENT  Pt presents to speech therapy evaluation with primary c/o of reduced speech intelligibility. Results of an interview as well as formal/informal assessment suggest mildly reduced speech intelligibility due to minimal undifferentiated dysarthria characterized by articulatory imprecision, reduced breath support, reduced vocal intensity,reduced alternating and sequential motion rates .  Deficits may negatively impact Pt's ability to communicate wants, needs, and ideas with a variety of communication partners in various settings.  Outcome measures support this diagnosis as max phonation time was 10 seconds which is reduced.  Vocal intensity for sustained  phonation of /ah/ was 71.9 dB, which is also reduced. In addition, reduced articulatory precision noted with posterior lingual sounds k/g as well as labio-dental sounds t/d.  Pt and SLP discussed evaluation findings, pathology, and  POC. Skilled Speech Therapy is medically necessary to address the above impairments and reach functional goals.        OBJECTIVE:     ORAL MOTOR MECHANISM  Facial and Oral Structure/Apperance: WFL  Symmetry: Symmetrical  Strength: WFL  Tone: WFL  Range of Motion: WFL  Rate of Motion: WFL    SPEECH PRODUCTION DEFICITS:  Severity: Mild  Type: Dysarthria  Comments: Undifferentiated dysarthria characterized by reduced articulatory precision, reduced breath support, reduced vocal intensity.  Deficits negatively impact Pt's ability to communicate wants, needs, and ideas with a variety of communication partners in various settings.      Phoneme Production  Imprecise consonant production    Voice Quality  Asthenic    Voice Production  WNL    Speech Rate  WNL    Loudness  Reduced    Respiration  Decreased breath support for speech  Speaking on low air    Resonance  WNL    Prosody  Monoloudness  Monopitch    Fluency  WNL        SMRs  Puh: 5.8 (WNL=6.4)  Tuh: 5.6 (WNL = 6.1)  Kuh: 4.6 (WNL = 5.7)    AMR  Puh-tuh-kuh: 2.8 and 33/15 seconds (WNL = 25/15 seconds)      Communication Participation Item Bank (CPIB): Score: 22/30 QOL measurement for speech in which a higher score indicates less interference on communication participation          Maximum Phonation time: 10 seconds= significantly reduced (WNL = 22.96 seconds, SD: 8.40)   Females Males   18-39 yrs 50-59 yrs 60-89 yrs 18-39 yrs 40-59 yrs 60-89 yrs   17.69 (5.81) 19.82 (6.14) 20.02 (6.58) 21.29 (5.92) 22.96 (8.40) 19.94 (6.79)        Vocal intensity of /ah/: 71.9 dB - reduced (WNL = 77.70, SD: 4.70) Increased to 84.2 dB with MPT of 8 seconds when cued.  Vocal intensity of sentences: not obtained  Vocal intensity of paragraph: 73.45 dB (WNL =   69.7 dB, SD: 3.4)  Vocal intensity of conversation: 71.2 dB- reduced (WNL = 74.0 dB, SD: 3.2)         IOPI lingual strength measurements:    MIPA: 50 kPa WFL (between 25-50th percentile)  MIPP: 57 kPa WFL (at 50th percentile)    Today's Treatment:  Pt education was provided on exam findings, treatment diagnosis, treatment plan, expectations, and prognosis.     Charges: Tara x1, [id number]      Total Timed Treatment: 0 min     Total Treatment Time: 45 min     PLAN OF CARE:    Goals: (to be met in 12 visits)     LTG:    To improve speech intelligibility via use of compensatory strategies to improve breath support and vocal loudness in order to effectively communicate wants, needs, and ideas with various communication partners across setting.      STGs:  STGs  To perform x5 sets of x5 reps via EMST-150 at X cmH20 with breaks per EMST-150 protocol      To perform sustained phonation exercise “ah” for 10 seconds  x10 reps, between 85-90 dB, min verbal cues     Pt will express compensatory speech intelligibility strategies, SLOB (SLOW, LOUD, Overarticulate, Breath Often) with 100% accuracy x 1-2 sessions after initial review.       To complete x 10 repetitions each of Tongue Base Retraction Exercises after initial review given min cues.      To orally read a list of words/phrases, paragraphs with 100% intelligibility with  use of compensatory strategies given min verbal cues.        To complete oral reading of complex tongue twisters to enhance coordination control, speech rate, and speech intelligibility with 80% accuracy, min verbal cues.       To utilize compensatory strategies (SLOW, LOUD, OVERARTICULATE, BREATH OFTEN) to enhance speech intelligibility during 5 minutes of conversation given min verbal cues; 100% intelligibility.          Frequency / Duration: Patient will be seen for 1 x/week or a total of 12 visits over a 90 day period. Treatment will include: Speech Therapy    Education or treatment limitation:  None  Rehab Potential:good      Patient/Family/Caregiver was advised of these findings, precautions, and treatment options and has agreed to actively participate in planning and for this course of care.    Thank you for your referral. Please co-sign or sign and return this letter via fax as soon as possible to [phone]. If you have any questions, please contact me at Dept: [phone]    Sincerely,  Electronically signed by therapist: Jessica Sweet, TRU  Physician's certification required: Yes  I certify the need for these services furnished under this plan of treatment and while under my care.    X___________________________________________________ Date____________________    Certification From: 9/9/2024  To:12/8/2024

## 2024-09-16 ENCOUNTER — APPOINTMENT (OUTPATIENT)
Dept: SPEECH THERAPY | Facility: HOSPITAL | Age: 44
End: 2024-09-16
Attending: INTERNAL MEDICINE
Payer: COMMERCIAL

## 2024-09-19 ENCOUNTER — OFFICE VISIT (OUTPATIENT)
Dept: SPEECH THERAPY | Facility: HOSPITAL | Age: 44
End: 2024-09-19
Attending: INTERNAL MEDICINE
Payer: COMMERCIAL

## 2024-09-19 PROCEDURE — 92507 TX SP LANG VOICE COMM INDIV: CPT

## 2024-09-19 NOTE — PROGRESS NOTES
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      Pain: 0/10      Objective:   Date: 9/19/2024  TX#: 2 Date:                 TX#: 3 Date:                 TX#: 4 Date:                 TX#: 5 Date:   Tx#: 6/   EMST-150 Ordering Information Provided       SLOB (Slow-Loud-Overarticulate-Breath Often) Speech Intelligibility Strategies Introduced/Diaphragmatic Breathing Exercises Introduced       Over-articulation:  Words using Scaffold of words broken out by syllables-100%  Words without scaffold-100%  Words within sentences-80% acc  Occasional-Min verbal cues       /t/ sound  Over-articulation of sound within word  Initial: 100%  Medial: 100%  Final: 90%    Over-articulation of sound in words within sentences: 90% acc  Min verbal cues             Assessment: Pt seen for first therapy session after initial evaluation.  Results of assessment reviewed with Pt.  All questions answered.  Provided Pt with handout on Expiratory Muscle Strength Training (EMST-150) device.  Ordering information and rationale for device explained in detail.  Pt verbalized understanding.  Introduced Diaphragmatic Breathing technique and exercises via explanation and handout.  Speech intelligibility strategies (SLOB) were introduced, which include slow rate, loud, over-articulation, and breath often.  These strategies were practiced within words and sentences.  Also addressed over-articulation of sounds within words, targeting /t/ this session.  Continued therapy is recommended to address POC and deficit areas.         Goals:   Goals: (to be met in 12 visits)      LTG:    To improve speech  intelligibility via use of compensatory strategies to improve breath support and vocal loudness in order to effectively communicate wants, needs, and ideas with various communication partners across setting.      STGs:  STGs  To perform x5 sets of x5 reps via EMST-150 at X cmH20 with breaks per EMST-150 protocol      To perform sustained phonation exercise “ah” for 10 seconds  x10 reps, between 85-90 dB, min verbal cues     Pt will express compensatory speech intelligibility strategies, SLOB (SLOW, LOUD, Overarticulate, Breath Often) with 100% accuracy x 1-2 sessions after initial review.       Pt will complete 2 sets of 25 repetitions for right/left labial and anterior/posterior lingual exercises set at 80% of max pressure level using the [place] Performance Instrument (IOPI) device as evidenced by hitting the green light indicator.    To complete x 10 repetitions each of Tongue Base Retraction Exercises after initial review given min cues.      To orally read a list of words/phrases, paragraphs with 100% intelligibility with  use of compensatory strategies given min verbal cues.        To complete oral reading of complex tongue twisters to enhance coordination control, speech rate, and speech intelligibility with 80% accuracy, min verbal cues.       To utilize compensatory strategies (SLOW, LOUD, OVERARTICULATE, BREATH OFTEN) to enhance speech intelligibility during 5 minutes of conversation given min verbal cues; 100% intelligibility.             Plan: Frequency / Duration: Patient will be seen for 1 x/week or a total of 12 visits over a 90 day period. Treatment will include: Speech Therapy    HEP: Order EMST-150 device; Diaphragmatic Breathing Exercises; Over-articulation worksheets and /t/ focus worksheets    Charges: [id number]       Total Timed Treatment: 0 min  Total Treatment Time: 45 min

## 2024-09-26 ENCOUNTER — OFFICE VISIT (OUTPATIENT)
Dept: SPEECH THERAPY | Facility: HOSPITAL | Age: 44
End: 2024-09-26
Attending: INTERNAL MEDICINE
Payer: COMMERCIAL

## 2024-09-26 PROCEDURE — 92507 TX SP LANG VOICE COMM INDIV: CPT

## 2024-09-26 NOTE — PROGRESS NOTES
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      Pain: 0/10      Objective:   Date: 9/19/2024  TX#: 2 Date: 9/26/2024               TX#: 3 Date:                 TX#: 4 Date:                 TX#: 5 Date:   Tx#: 6/   EMST-150 Ordering Information Provided EMST-150 Device:  Calibration @ 75 cm H2O  Blue Standard Mouthpiece  NO buccal hold needed  Completed 5 sets of 5 repetitions   Min cues for strong, quick puffs of airflow through device      SLOB (Slow-Loud-Overarticulate-Breath Often) Speech Intelligibility Strategies Introduced/Diaphragmatic Breathing Exercises Introduced       Over-articulation:  Words using Scaffold of words broken out by syllables-100%  Words without scaffold-100%  Words within sentences-80% acc  Occasional-Min verbal cues Over-articulation:  Words of increasing length (awake, awaken, awakening): 80% acc   Min verbal cues for over-articulation as words increase in length      /t/ sound  Over-articulation of sound within word  Initial: 100%  Medial: 100%  Final: 90%    Over-articulation of sound in words within sentences: 90% acc  Min verbal cues     /d/ sounds:  Over-articulation of sound within word  Initial:100%  Medial:90%  Final: 100%    Over-articulation of sounds within sentences: 90% acc  Min verbal cues      /k/ sounds:  Over-articulation of sound within word  Initial:100%  Medial:80%  Final: 90%    Over-articulation of sounds within sentences: 90% acc  Min verbal cues         Tongue Twisters:  Repetition x 3 each  Min verbal cues  70% acc.          Assessment: Therapy focused on education and training in use of compensatory speech  intelligibility strategies during structured exercises.  Expiratory Muscle Strength Training (EMST-150) device was calibrated at 75 cm H2O using the blue standard mouthpiece.  A buccal hold was not needed, but Pt required min verbal cues for strong, quick puffs of airflow through the device.  Handouts were provided with EMST-150 instructions, data tracker, and cleaning protocol.  All questions were answered related to rationale and exercise protocol.  Completed over-articulation of sounds within words and sentences, targeting /d/ and /k/ this session as well as over-articulation during words of increasing length and repetition of tongue twisters.  Continued therapy is recommended to address POC and deficit areas.         Goals:   Goals: (to be met in 12 visits)      LTG:    To improve speech intelligibility via use of compensatory strategies to improve breath support and vocal loudness in order to effectively communicate wants, needs, and ideas with various communication partners across setting.      STGs:  STGs  To perform x5 sets of x5 reps via EMST-150 at X cmH20 with breaks per EMST-150 protocol      To perform sustained phonation exercise “ah” for 10 seconds  x10 reps, between 85-90 dB, min verbal cues     Pt will express compensatory speech intelligibility strategies, SLOB (SLOW, LOUD, Overarticulate, Breath Often) with 100% accuracy x 1-2 sessions after initial review.       Pt will complete 2 sets of 25 repetitions for right/left labial and anterior/posterior lingual exercises set at 80% of max pressure level using the [place] Performance Instrument (IOPI) device as evidenced by hitting the green light indicator.    To complete x 10 repetitions each of Tongue Base Retraction Exercises after initial review given min cues.      To orally read a list of words/phrases, paragraphs with 100% intelligibility with  use of compensatory strategies given min verbal cues.        To complete oral reading of complex tongue  twisters to enhance coordination control, speech rate, and speech intelligibility with 80% accuracy, min verbal cues.       To utilize compensatory strategies (SLOW, LOUD, OVERARTICULATE, BREATH OFTEN) to enhance speech intelligibility during 5 minutes of conversation given min verbal cues; 100% intelligibility.             Plan: Frequency / Duration: Patient will be seen for 1 x/week or a total of 12 visits over a 90 day period. Treatment will include: Speech Therapy    HEP: EMST-150 device exercises; Diaphragmatic Breathing Exercises; Over-articulation worksheets and /t/, /d/, and /k/ focus worksheets, repetition of tongue twisters    Charges: [id number]       Total Timed Treatment: 0 min  Total Treatment Time: 45 min

## 2024-09-30 ENCOUNTER — APPOINTMENT (OUTPATIENT)
Dept: SPEECH THERAPY | Facility: HOSPITAL | Age: 44
End: 2024-09-30
Attending: INTERNAL MEDICINE
Payer: COMMERCIAL

## 2024-10-07 ENCOUNTER — OFFICE VISIT (OUTPATIENT)
Dept: SPEECH THERAPY | Facility: HOSPITAL | Age: 44
End: 2024-10-07
Attending: INTERNAL MEDICINE
Payer: COMMERCIAL

## 2024-10-07 PROCEDURE — 92507 TX SP LANG VOICE COMM INDIV: CPT

## 2024-10-07 NOTE — PROGRESS NOTES
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      Pain: 0/10      Objective:   Date: 9/19/2024  TX#: 2 Date: 9/26/2024               TX#: 3 Date: 10/7/2024                TX#: 4 Date:                 TX#: 5 Date:   Tx#: 6/   EMST-150 Ordering Information Provided EMST-150 Device:  Calibration @ 75 cm H2O  Blue Standard Mouthpiece  NO buccal hold needed  Completed 5 sets of 5 repetitions   Min cues for strong, quick puffs of airflow through device EMST-150 Device:  Calibration @ 90 cm H2O (Pt increased calibration setting at home during home practice exercises).    Blue Standard Mouthpiece  NO buccal hold needed  Completed 1 set of 5 repetitions to determine if device was calibrated at appropriate level   Mod I for completion of exercises      SLOB (Slow-Loud-Overarticulate-Breath Often) Speech Intelligibility Strategies Introduced/Diaphragmatic Breathing Exercises Introduced  100% expression of speech intelligibility strategies  Min Cues provided for use of over-articulation during conversational tasks     Over-articulation:  Words using Scaffold of words broken out by syllables-100%  Words without scaffold-100%  Words within sentences-80% acc  Occasional-Min verbal cues Over-articulation:  Words of increasing length (awake, awaken, awakening): 80% acc   Min verbal cues for over-articulation as words increase in length      /t/ sound  Over-articulation of sound within word  Initial: 100%  Medial: 100%  Final: 90%    Over-articulation of sound in words within sentences: 90% acc  Min verbal cues     /d/ sounds:  Over-articulation of sound within  word  Initial:100%  Medial:90%  Final: 100%    Over-articulation of sounds within sentences: 90% acc  Min verbal cues      /k/ sounds:  Over-articulation of sound within word  Initial:100%  Medial:80%  Final: 90%    Over-articulation of sounds within sentences: 90% acc  Min verbal cues         Tongue Twisters:  Repetition x 3 each  Min verbal cues  70% acc.   Tongue Twisters:  Repetition x 3 each  Min verbal cues  75% accuracy       [place] Performance Instrument (IOPI):   Lingual ANTERIOR:  Max Pressure: 62 kPa  80% of max pressure: 50 kPa  Completed 2 sets of 25 repetitions    Lingual POSTERIOR:  Max Pressure: 55 kPa  80% of max pressure: 44 kPa  Completed 2 sets of 25 repetitions    Labial RIGHT:  Max Pressure: 30 kPa  80% of max pressure: 24 kpa  Completed 2 sets of 25 repetitions    Labial LEFT:   Max Pressure: 31 kPa  80% of max pressure: 24 kPa  Completed 2 sets of 25 repetitions          Tongue Base Retraction Exercises Introduced:  Tongue sweeps  k/g and k/g word practice,     Tongue Hold  Tongue Pull Back Exercise       Assessment:  [place] Performance Instrument exercises were introduced and completed this session. Completed anterior and posterior lingual exercises as well as right and left labial exercises.  All exercises were set at 80% of max pressure.  Pt was able to hit green light indicator, but had difficulty holding green light for more than 1 second.  Pt increased calibration of the Expiratory Muscle Strength Training (EMST-150) device at home.  Pt set device to 90 cm H2O.  Completed 1 set of 5 repetitions using the blue mouthpiece and no buccal hold to determine if device was calibrated and set at appropriate level.  Occasional cues provided for strong, quick puffs of air through the device.  Introduced tongue base retraction exercises, including tongue sweeps exercise, tongue hold exercise, tongue pull back exercise, and k/g with word practice exercise.  Pt requires min cues to utilize speech  intelligibility exercises during conversational tasks.  Continue therapy to address POC and areas of deficits.                 Goals:   Goals: (to be met in 12 visits)      LTG:    To improve speech intelligibility via use of compensatory strategies to improve breath support and vocal loudness in order to effectively communicate wants, needs, and ideas with various communication partners across setting.      STGs:  STGs  To perform x5 sets of x5 reps via EMST-150 at X cmH20 with breaks per EMST-150 protocol      To perform sustained phonation exercise “ah” for 10 seconds  x10 reps, between 85-90 dB, min verbal cues     Pt will express compensatory speech intelligibility strategies, SLOB (SLOW, LOUD, Overarticulate, Breath Often) with 100% accuracy x 1-2 sessions after initial review.       Pt will complete 2 sets of 25 repetitions for right/left labial and anterior/posterior lingual exercises set at 80% of max pressure level using the [place] Performance Instrument (IOPI) device as evidenced by hitting the green light indicator.    To complete x 10 repetitions each of Tongue Base Retraction Exercises after initial review given min cues.      To orally read a list of words/phrases, paragraphs with 100% intelligibility with  use of compensatory strategies given min verbal cues.        To complete oral reading of complex tongue twisters to enhance coordination control, speech rate, and speech intelligibility with 80% accuracy, min verbal cues.       To utilize compensatory strategies (SLOW, LOUD, OVERARTICULATE, BREATH OFTEN) to enhance speech intelligibility during 5 minutes of conversation given min verbal cues; 100% intelligibility.             Plan: Frequency / Duration: Patient will be seen for 1 x/week or a total of 12 visits over a 90 day period. Treatment will include: Speech Therapy    HEP: EMST-150 device exercises; Diaphragmatic Breathing Exercises; Over-articulation worksheets and /t/, /d/, and /k/ focus  worksheets, repetition of tongue twisters, tongue base retraction exercises.      Charges: [id number]       Total Timed Treatment: 0 min  Total Treatment Time: 45 min

## 2024-10-21 ENCOUNTER — APPOINTMENT (OUTPATIENT)
Dept: SPEECH THERAPY | Facility: HOSPITAL | Age: 44
End: 2024-10-21
Attending: INTERNAL MEDICINE
Payer: COMMERCIAL

## 2024-10-25 ENCOUNTER — TELEPHONE (OUTPATIENT)
Dept: PHYSICAL THERAPY | Facility: HOSPITAL | Age: 44
End: 2024-10-25

## 2024-10-31 ENCOUNTER — TELEPHONE (OUTPATIENT)
Dept: SPEECH THERAPY | Facility: HOSPITAL | Age: 44
End: 2024-10-31

## 2024-11-06 ENCOUNTER — TELEPHONE (OUTPATIENT)
Dept: PHYSICAL THERAPY | Facility: HOSPITAL | Age: 44
End: 2024-11-06

## 2024-11-11 ENCOUNTER — OFFICE VISIT (OUTPATIENT)
Dept: SURGERY | Facility: CLINIC | Age: 44
End: 2024-11-11
Payer: COMMERCIAL

## 2024-11-11 DIAGNOSIS — N39.0 RECURRENT UTI: ICD-10-CM

## 2024-11-11 DIAGNOSIS — N40.1 BPH WITH OBSTRUCTION/LOWER URINARY TRACT SYMPTOMS: Primary | ICD-10-CM

## 2024-11-11 DIAGNOSIS — N13.8 BPH WITH OBSTRUCTION/LOWER URINARY TRACT SYMPTOMS: Primary | ICD-10-CM

## 2024-11-11 PROCEDURE — 99213 OFFICE O/P EST LOW 20 MIN: CPT | Performed by: UROLOGY

## 2024-11-11 PROCEDURE — 51798 US URINE CAPACITY MEASURE: CPT | Performed by: UROLOGY

## 2024-11-11 RX ORDER — TADALAFIL 5 MG/1
5 TABLET ORAL
Qty: 90 TABLET | Refills: 3 | Status: SHIPPED | OUTPATIENT
Start: 2024-11-11

## 2024-11-11 NOTE — PROGRESS NOTES
Cleveland Vcik is a 44 year old male.    HPI:     Chief Complaint   Patient presents with    Follow - Up     Patient is here following up on CT Scan imaging, and tadalfil follow up prescribed 3 months ago. Pt states that it is working for him.      44-year-old male in follow-up to a previous visit July 26, 2024.  He presented originally with BPH and lower urinary tract symptoms as well as erectile dysfunction currently controlled well on tadalafil 5 mg daily.  Had a previous episode of urinary tract infection.  Evaluation included a renal bladder ultrasound July 2024 showing moderate left hydronephrosis without hydroureter suggestive of possible UPJ obstruction with a postvoid residual volume on that ultrasound of 59 mL.  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  He denies any flank pain or UTI symptoms.  Continues to report well-controlled symptoms with an IPSS score today of 5 quality-of-life index of 2.      HISTORY:  Past Medical History:    Ankle fracture    Casting 6 weeks    Chalazion of left upper eyelid    Chalazion right upper eyelid    Nasal septal deviation    Physical exam, annual    Routine general medical examination at a health care facility      Past Surgical History:   Procedure Laterality Date    Excision of chalazion, single - od - right eye Right 11/06/2019    RUL    Eye surgery  2008    Prk - od - right eye Right 2005    Prk - os - left eye Left 2005      Family History   Problem Relation Age of Onset    Hypertension Father 72    Heart Disorder Father         CABG x 4    Other (cabg x 4) Father     Hypertension Mother 70    Stroke Mother 65        CVA    Other (Other) Brother         sleep apnea    Hypertension Brother     Diabetes Neg     Glaucoma Neg     Macular degeneration Neg       Social History:   Social History     Socioeconomic History     Marital status:    Tobacco Use    Smoking status: Never    Smokeless tobacco: Never   Substance and Sexual Activity    Alcohol use: No    Drug use: No   Other Topics Concern    Caffeine Concern Yes     Comment: 1 cup daily        Medications (Active prior to today's visit):  Current Outpatient Medications   Medication Sig Dispense Refill    tadalafil 5 MG Oral Tab Take 1 tablet (5 mg total) by mouth daily as needed. 90 tablet 3    Sildenafil Citrate 100 MG Oral Tab Take 1 tablet (100 mg total) by mouth daily as needed for Erectile Dysfunction. 40 tablet 0       Allergies:  Allergies[1]      ROS:       PHYSICAL EXAM:   Bladder scan for postvoid residual volume 135 mL     ASSESSMENT/PLAN:   Assessment   Encounter Diagnoses   Name Primary?    BPH with obstruction/lower urinary tract symptoms Yes    Recurrent UTI        Recommend:  - Continue on tadalafil 5 mg daily.  - Return to clinic in 1 year.  - Call if he has any problems or concerns.         Orders This Visit:  No orders of the defined types were placed in this encounter.      Meds This Visit:  Requested Prescriptions      No prescriptions requested or ordered in this encounter       Imaging & Referrals:  None     11/11/2024  Ameena Oconnell MD               [1] No Known Allergies

## 2024-11-14 ENCOUNTER — TELEPHONE (OUTPATIENT)
Dept: PHYSICAL THERAPY | Facility: HOSPITAL | Age: 44
End: 2024-11-14

## 2024-11-21 ENCOUNTER — OFFICE VISIT (OUTPATIENT)
Dept: SPEECH THERAPY | Facility: HOSPITAL | Age: 44
End: 2024-11-21
Attending: INTERNAL MEDICINE
Payer: COMMERCIAL

## 2024-11-21 PROCEDURE — 92507 TX SP LANG VOICE COMM INDIV: CPT

## 2024-11-21 NOTE — PROGRESS NOTES
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    Assessment:  Pt was seen for 5 skilled OP speech therapy visits between 9/9/2024 - 11/21/2024.  Therapy focused on education and training of speech intelligibility strategies, as well as evidence based exercises for increased strength, ROM, agility, and coordination and speech intelligibility exercises for words, phrases, and sentences.  Completed Expiratory Muscle Strength Training (EMST-150) exercises.  Pt improved from 75 cm H2O to 101.25 cm H20.  Pt is independent in calibration of device and completes exercises at home independently.  At highest calibration level, there was occasional leakage of air from the corners of his lips.  Discussed possible need for buccal hold as calibration of device increases, but was not utilized at any sessions.  [place] Performance Instrument (IOPI) exercises were completed with Pt demonstrating anterior and posterior lingual pressure levels that are WNL.  He was able to complete all exercises at 80% of max pressure without difficulty.  Speech intelligibility strategies were reviewed, including slow rate of speech, loud volume, over-articulation of words and sounds, and breathing often.  During 30 minutes of conversation, Pt required x 3 cues for over-articulation of longer length words as well as sounds made with back of tongue, including /k/, /g/, and consonant blend sounds.  Pt was 100% intelligible throughout final session, with only minimal-mild articulatory imprecision and fast rate of speech.  Added diadochokinetic exercises using CVCV and C1V1 C1V2 exercises.  Pt demonstrated  increased difficulty with increasing complexity sounds such as C1V1 C1V2 alternating motion rates.  Pt was able to complete 22/15 seconds. These exercises were provided to Pt for home practice.  Pt reports that he completes tongue twisters as part of home practice exercises as understands need for increased rate, breathing, and accuracy of words with increased speed.  Discussed progressive nature of Pt's illness and options that may be available to Pt in the future such as speech banking and use of assistive technology.  Pt verbalized understanding of these options as a consideration for the future. Pt reports that he feels he is able to complete all exercises at home and is independent in completing home program.  Pt reports that speech sessions are financially straining and due to his ability to complete provided exercises at home, he will be discharged from skilled speech therapy at this time with recommendation to complete daily home program. Pt verbalized understanding and agreement.  He may benefit from re-assessment in the future with home program modification as needed given progressive nature of illness.     Goals:   Goals: (to be met in 12 visits)      LTG:    To improve speech intelligibility via use of compensatory strategies to improve breath support and vocal loudness in order to effectively communicate wants, needs, and ideas with various communication partners across setting.      STGs:  STGs  To perform x5 sets of x5 reps via EMST-150 at X cmH20 with breaks per EMST-150 protocol   11/21/2024-Goal Met-Not plateaued, but Pt independent in ability to calibrate device as needed.     To perform sustained phonation exercise “ah” for 10 seconds  x10 reps, between 85-90 dB, min verbal cues  NOT ADDRESSED due to focus on other areas.   Pt will express compensatory speech intelligibility strategies, SLOB (SLOW, LOUD, Overarticulate, Breath Often) with 100% accuracy x 1-2 sessions after initial review.     11/21/2024-Goal Met   Pt will complete 2 sets of 25 repetitions for right/left labial and anterior/posterior lingual exercises set at 80% of max pressure level using the [place] Performance Instrument (IOPI) device as evidenced by hitting the green light indicator. 10/7/2024-Goal Met-Anterior and Posterior Lingual Max Pressure WNL with Pt able to complete all exercises at 80% of max pressure without difficulty.     To complete x 10 repetitions each of Tongue Base Retraction Exercises after initial review given min cues.   11/21/2024-Goal Met   To orally read a list of words/phrases, paragraphs with 100% intelligibility with  use of compensatory strategies given min verbal cues.     9/26/2024-Goal Met % accuracy for words and sentences.  Paragraphs not addressed.   To complete oral reading of complex tongue twisters to enhance coordination control, speech rate, and speech intelligibility with 80% accuracy, min verbal cues.    11/21/2024-Goal Met-added Diadochokinetic Exercises for added rate and accuracy exercises for Pt to complete at home.     To utilize compensatory strategies (SLOW, LOUD, OVERARTICULATE, BREATH OFTEN) to enhance speech intelligibility during 5 minutes of conversation given min verbal cues; 100% intelligibility.    11/21/2024-Goal Met         Plan: Frequency / Duration: Discharge Pt from skilled OP speech therapy at this time.      HEP: EMST-150 device exercises; Diaphragmatic Breathing Exercises; Over-articulation worksheets and /t/, /d/, and /k/ focus worksheets, repetition of tongue twisters, tongue base retraction exercises.      Patient/Family/Caregiver was advised of these findings, precautions, and treatment options and has agreed to actively participate in planning and for this course of care.    Thank you for your referral. If you have any questions, please contact me at Dept: [phone].    Sincerely,  Electronically signed by therapist: Jessica Sweet, SLP     Physician's  certification required:  No      Certification From: 11/21/2024  To:2/19/2025                                        Subjective:   Pt reports that he completes speech exercises approximately 5 days/week and has increased calibration of the EMST-150 device since last session.  Pt reports that he asks his family not to multi-task when talking to him so they can directly look at him and gain benefit from looking at his lips when he is talking, in order to enhance speech intelligibility.       Pain: 0/10      Objective:   Date: 9/19/2024  TX#: 2 Date: 9/26/2024               TX#: 3 Date: 10/7/2024                TX#: 4 Date: 11/21/2024                TX#: 5   EMST-150 Ordering Information Provided EMST-150 Device:  Calibration @ 75 cm H2O  Blue Standard Mouthpiece  NO buccal hold needed  Completed 5 sets of 5 repetitions   Min cues for strong, quick puffs of airflow through device EMST-150 Device:  Calibration @ 90 cm H2O (Pt increased calibration setting at home during home practice exercises).    Blue Standard Mouthpiece  NO buccal hold needed  Completed 1 set of 5 repetitions to determine if device was calibrated at appropriate level   Mod I for completion of exercises  EMST-150 Device:  Calibration:  Pt increased calibration at home from 90 cm H2O to 97.5 cm H2O.    Today, calibration increased to 101.25 cm H2O  Blue Standard Mouthpiece  NO buccal hold needed; occasional leakage of air from corners of lips.    Pt completed 1 set of 5 repetitions to check ability to complete exercises at this level.    Discussed Pt increasing calibration as able to tolerate at home as part of maintenance and HEP.     SLOB (Slow-Loud-Overarticulate-Breath Often) Speech Intelligibility Strategies Introduced/Diaphragmatic Breathing Exercises Introduced  100% expression of speech intelligibility strategies  Min Cues provided for use of over-articulation during conversational tasks SLOB strategies-expression  100%   No supports    Over-articulation:  Words using Scaffold of words broken out by syllables-100%  Words without scaffold-100%  Words within sentences-80% acc  Occasional-Min verbal cues Over-articulation:  Words of increasing length (awake, awaken, awakening): 80% acc   Min verbal cues for over-articulation as words increase in length  X 3 cues required during 30 minutes of conversation for over-articulation of longer length words and sounds such as /k/, /g/, and consonant blends.  Intelligibility-100%  Min cues related to vocal intensity provided.  Discussed need to increase volume, especially when in situations with increased background noise and activity.    Pt verbalized understanding.      /t/ sound  Over-articulation of sound within word  Initial: 100%  Medial: 100%  Final: 90%    Over-articulation of sound in words within sentences: 90% acc  Min verbal cues     /d/ sounds:  Over-articulation of sound within word  Initial:100%  Medial:90%  Final: 100%    Over-articulation of sounds within sentences: 90% acc  Min verbal cues      /k/ sounds:  Over-articulation of sound within word  Initial:100%  Medial:80%  Final: 90%    Over-articulation of sounds within sentences: 90% acc  Min verbal cues        Tongue Twisters:  Repetition x 3 each  Min verbal cues  70% acc.   Tongue Twisters:  Repetition x 3 each  Min verbal cues  75% accuracy Tongue Twisters: 80%  Diadochokinetic Exercises:  Goal:   30 targets in 15 seconds  CVCV-  /mamamama/: 50/15 seconds  CVCV- /babababab?: 50/15 seconds  C1V1 C1V2- /LeeLow/: 22/15 seconds     [place] Performance Instrument (IOPI):   Lingual ANTERIOR:  Max Pressure: 62 kPa  80% of max pressure: 50 kPa  Completed 2 sets of 25 repetitions    Lingual POSTERIOR:  Max Pressure: 55 kPa  80% of max pressure: 44 kPa  Completed 2 sets of 25 repetitions    Labial RIGHT:  Max Pressure: 30 kPa  80% of max pressure: 24 kpa  Completed 2 sets of 25 repetitions    Labial LEFT:   Max Pressure: 31 kPa  80% of max pressure:  24 kPa  Completed 2 sets of 25 repetitions         Tongue Base Retraction Exercises Introduced:  Tongue sweeps  k/g and k/g word practice,     Tongue Hold  Tongue Pull Back Exercise Reviewed and Pt able to return demonstration for tongue sweeps, tongue hold, and tongue pull backs-80%     Assessment:  Pt was seen for skilled OP speech therapy visit.  Completed Expiratory Muscle Strength Training (EMST-150) exercises.  Pt reports he increased calibration of device at home since last session from 90 cm H2O to 97.5 cm H2O.  Today, device calibration was increased to 101.25 cm H2O.  Pt was able to complete 1 set of 5 repetitions without difficulty using the blue standard mouthpiece.  There was occasional leakage of air from the corners of his lips.  Discussed possible need for buccal hold as calibration of device increases, but was not utilized this session.  Speech intelligibility strategies were reviewed, including slow rate of speech, loud volume, over-articulation of words and sounds, and breathing often.  During 30 minutes of conversation, Pt required x 3 cues for over-articulation of longer length words as well as sounds made with back of tongue, including /k/, /g/, and consonant blend sounds.  Pt was 100% intelligible throughout session, with only minimal-mild articulatory imprecision and fast rate of speech.  Added diadochokinetic exercises using CVCV and C1V1 C1V2 exercises.  Pt demonstrated increased difficulty with increasing complexity sounds such as C1V1 C1V2 alternating motion rates.  Pt was able to complete 22/15 seconds. These exercises were provided to Pt for home practice.  In addition, discussed progressive nature of Pt's illness and options that may be available to Pt in the future such as speech banking and use of assistive technology.  Pt verbalized understanding of these options as a consideration for the future. Pt reports that he feels he is able to complete all exercises at home and is  independent in completing home program.  Pt reports that speech sessions are financially straining and due to his ability to complete provided exercises at home, he will be discharged from skilled speech therapy at this time with recommendation to complete daily home program. Pt verbalized understanding and agreement.  He may benefit from re-assessment in the future with home program modification as needed given progressive nature of illness.                        Goals:   Goals: (to be met in 12 visits)      LTG:    To improve speech intelligibility via use of compensatory strategies to improve breath support and vocal loudness in order to effectively communicate wants, needs, and ideas with various communication partners across setting.      STGs:  STGs  To perform x5 sets of x5 reps via EMST-150 at X cmH20 with breaks per EMST-150 protocol   11/21/2024-Goal Met-Not plateaued, but Pt independent in ability to calibrate device as needed.     To perform sustained phonation exercise “ah” for 10 seconds  x10 reps, between 85-90 dB, min verbal cues  NOT ADDRESSED due to focus on other areas.   Pt will express compensatory speech intelligibility strategies, SLOB (SLOW, LOUD, Overarticulate, Breath Often) with 100% accuracy x 1-2 sessions after initial review.    11/21/2024-Goal Met   Pt will complete 2 sets of 25 repetitions for right/left labial and anterior/posterior lingual exercises set at 80% of max pressure level using the [place] Performance Instrument (IOPI) device as evidenced by hitting the green light indicator. 10/7/2024-Goal Met-Anterior and Posterior Lingual Max Pressure WNL with Pt able to complete all exercises at 80% of max pressure without difficulty.     To complete x 10 repetitions each of Tongue Base Retraction Exercises after initial review given min cues.   11/21/2024-Goal Met   To orally read a list of words/phrases, paragraphs with 100% intelligibility with  use of compensatory strategies given  min verbal cues.     9/26/2024-Goal Met % accuracy for words and sentences.  Paragraphs not addressed.   To complete oral reading of complex tongue twisters to enhance coordination control, speech rate, and speech intelligibility with 80% accuracy, min verbal cues.    11/21/2024-Goal Met-added Diadochokinetic Exercises for added rate and accuracy exercises for Pt to complete at home.     To utilize compensatory strategies (SLOW, LOUD, OVERARTICULATE, BREATH OFTEN) to enhance speech intelligibility during 5 minutes of conversation given min verbal cues; 100% intelligibility.    11/21/2024-Goal Met         Plan: Frequency / Duration: Discharge Pt from skilled OP speech therapy at this time.      HEP: EMST-150 device exercises; Diaphragmatic Breathing Exercises; Over-articulation worksheets and /t/, /d/, and /k/ focus worksheets, repetition of tongue twisters, tongue base retraction exercises.      Charges: [id number]       Total Timed Treatment: 0 min  Total Treatment Time: 45 min

## 2024-12-02 ENCOUNTER — PATIENT MESSAGE (OUTPATIENT)
Dept: SURGERY | Facility: CLINIC | Age: 44
End: 2024-12-02

## 2024-12-05 ENCOUNTER — APPOINTMENT (OUTPATIENT)
Dept: SPEECH THERAPY | Facility: HOSPITAL | Age: 44
End: 2024-12-05
Attending: INTERNAL MEDICINE
Payer: COMMERCIAL

## 2024-12-05 ENCOUNTER — TELEPHONE (OUTPATIENT)
Dept: SURGERY | Facility: CLINIC | Age: 44
End: 2024-12-05

## 2024-12-05 NOTE — TELEPHONE ENCOUNTER
- CORINE created and [facility]     Cleveland MERRILL  Urology Clinical Staff (supporting Ameena Oconnell MD)3 days ago     I am taking tadalafill daily. It certainly reduces the urgency I previously experienced. I have noticed an increase in the frequency of urination at night. In the past two weeks I have consistently had to urinate 2 times in the night. Is there another solution to pursue ultimate address this?

## 2024-12-06 NOTE — TELEPHONE ENCOUNTER
Make sure he is restricting fluid intake for at least 3 hours prior to bedtime.  Otherwise, can discuss at his next scheduled appointment on December 9.

## 2024-12-12 ENCOUNTER — MED REC SCAN ONLY (OUTPATIENT)
Dept: INTERNAL MEDICINE CLINIC | Facility: CLINIC | Age: 44
End: 2024-12-12

## 2024-12-12 ENCOUNTER — APPOINTMENT (OUTPATIENT)
Dept: SPEECH THERAPY | Facility: HOSPITAL | Age: 44
End: 2024-12-12
Attending: INTERNAL MEDICINE
Payer: COMMERCIAL

## 2025-01-27 ENCOUNTER — TELEPHONE (OUTPATIENT)
Dept: SPEECH THERAPY | Facility: HOSPITAL | Age: 45
End: 2025-01-27

## 2025-01-27 NOTE — TELEPHONE ENCOUNTER
Returned Pt phone call.  Spoke with Pt.  He is requesting more information on voice banking.  Told Pt I would gather voice banking educational materials and get back to him as soon as possible.

## 2025-04-30 ENCOUNTER — PATIENT MESSAGE (OUTPATIENT)
Dept: INTERNAL MEDICINE CLINIC | Facility: CLINIC | Age: 45
End: 2025-04-30

## 2025-04-30 DIAGNOSIS — Z00.00 ANNUAL PHYSICAL EXAM: Primary | ICD-10-CM

## 2025-05-01 ENCOUNTER — OFFICE VISIT (OUTPATIENT)
Dept: INTERNAL MEDICINE CLINIC | Facility: CLINIC | Age: 45
End: 2025-05-01
Payer: COMMERCIAL

## 2025-05-01 VITALS
DIASTOLIC BLOOD PRESSURE: 82 MMHG | HEART RATE: 88 BPM | RESPIRATION RATE: 18 BRPM | OXYGEN SATURATION: 98 % | WEIGHT: 145 LBS | TEMPERATURE: 98 F | BODY MASS INDEX: 21.48 KG/M2 | HEIGHT: 69 IN | SYSTOLIC BLOOD PRESSURE: 126 MMHG

## 2025-05-01 DIAGNOSIS — R05.2 SUBACUTE COUGH: Primary | ICD-10-CM

## 2025-05-01 PROCEDURE — 3008F BODY MASS INDEX DOCD: CPT | Performed by: INTERNAL MEDICINE

## 2025-05-01 PROCEDURE — 99213 OFFICE O/P EST LOW 20 MIN: CPT | Performed by: INTERNAL MEDICINE

## 2025-05-01 PROCEDURE — 3079F DIAST BP 80-89 MM HG: CPT | Performed by: INTERNAL MEDICINE

## 2025-05-01 PROCEDURE — 3074F SYST BP LT 130 MM HG: CPT | Performed by: INTERNAL MEDICINE

## 2025-05-01 RX ORDER — MONTELUKAST SODIUM 10 MG/1
10 TABLET ORAL NIGHTLY
Qty: 30 TABLET | Refills: 0 | Status: SHIPPED | OUTPATIENT
Start: 2025-05-01

## 2025-05-01 NOTE — PROGRESS NOTES
Subjective:   Patient ID: Cleveland Vick is a 44 year old male.    HPI  Patient comes in today with complaint of ongoing cough has been going on for over a week now initially when the symptoms started he did have some mild sore throat but that is resolved sometimes he could hear himself wheezing he is using inhaler which was given to him long time ago again no chest pain or shortness of breath no fever.  He is not so sure but with season change she does get some runny eyes  History/Other:   Review of Systems   Constitutional: Negative.  Negative for fatigue and fever.   HENT: Negative.  Negative for congestion.    Eyes: Negative.    Respiratory:  Positive for cough. Negative for shortness of breath and wheezing.    Cardiovascular: Negative.  Negative for chest pain, palpitations and leg swelling.   Gastrointestinal: Negative.    Endocrine: Negative for cold intolerance and heat intolerance.   Genitourinary: Negative.  Negative for dysuria, flank pain and hematuria.   Musculoskeletal: Negative.  Negative for arthralgias, back pain and myalgias.   Skin: Negative.    Neurological: Negative.  Negative for dizziness, tremors, syncope, weakness and headaches.   Psychiatric/Behavioral: Negative.  Negative for agitation, behavioral problems and suicidal ideas. The patient is not nervous/anxious.      Current Medications[1]  Allergies:Allergies[2]    Objective:   Physical Exam  Vitals and nursing note reviewed.   Constitutional:       Appearance: He is well-developed.   HENT:      Head: Normocephalic and atraumatic.      Right Ear: External ear normal.      Left Ear: External ear normal.      Nose: Nose normal.   Eyes:      Conjunctiva/sclera: Conjunctivae normal.      Pupils: Pupils are equal, round, and reactive to light.   Cardiovascular:      Rate and Rhythm: Normal rate and regular rhythm.      Heart sounds: Normal heart sounds.   Pulmonary:      Effort: Pulmonary effort is normal.      Breath sounds: Normal  breath sounds.   Abdominal:      General: Bowel sounds are normal.      Palpations: Abdomen is soft.   Genitourinary:     Penis: Normal.       Prostate: Normal.      Rectum: Normal.   Musculoskeletal:         General: Normal range of motion.      Cervical back: Normal range of motion and neck supple.   Skin:     General: Skin is warm and dry.   Neurological:      Mental Status: He is alert and oriented to person, place, and time.      Deep Tendon Reflexes: Reflexes are normal and symmetric.         Assessment & Plan:   1. Subacute cough likely reactive lung disease due to viral disease he had recently possible allergies also will give Singulair take daily for the next 10 days to 2 weeks also can start taking antihistamine over-the-counter for same amount of time let us know if not better or any worsening symptoms       No orders of the defined types were placed in this encounter.      Meds This Visit:  Requested Prescriptions     Signed Prescriptions Disp Refills    montelukast (SINGULAIR) 10 MG Oral Tab 30 tablet 0     Sig: Take 1 tablet (10 mg total) by mouth nightly.       Imaging & Referrals:  None         [1]   Current Outpatient Medications   Medication Sig Dispense Refill    montelukast (SINGULAIR) 10 MG Oral Tab Take 1 tablet (10 mg total) by mouth nightly. 30 tablet 0    tadalafil 5 MG Oral Tab Take 1 tablet (5 mg total) by mouth daily as needed. 90 tablet 3    Sildenafil Citrate 100 MG Oral Tab Take 1 tablet (100 mg total) by mouth daily as needed for Erectile Dysfunction. 40 tablet 0   [2] No Known Allergies

## 2025-05-01 NOTE — TELEPHONE ENCOUNTER
Lab orders faxed via RIGHTFAX to:    [facility] at [address], [place] [address]  Fax: [phone]

## 2025-05-08 LAB
ABSOLUTE BASOPHILS: 80 CELLS/UL (ref 0–200)
ABSOLUTE EOSINOPHILS: 879 CELLS/UL (ref 15–500)
ABSOLUTE LYMPHOCYTES: 1828 CELLS/UL (ref 850–3900)
ABSOLUTE MONOCYTES: 329 CELLS/UL (ref 200–950)
ABSOLUTE NEUTROPHILS: 1584 CELLS/UL (ref 1500–7800)
ALBUMIN/GLOBULIN RATIO: 1.8 (CALC) (ref 1–2.5)
ALBUMIN: 4.5 G/DL (ref 3.6–5.1)
ALKALINE PHOSPHATASE: 47 U/L (ref 36–130)
ALT: 16 U/L (ref 9–46)
AST: 22 U/L (ref 10–40)
BASOPHILS: 1.7 %
BILIRUBIN, TOTAL: 0.6 MG/DL (ref 0.2–1.2)
BUN: 16 MG/DL (ref 7–25)
CALCIUM: 9.2 MG/DL (ref 8.6–10.3)
CARBON DIOXIDE: 27 MMOL/L (ref 20–32)
CHLORIDE: 101 MMOL/L (ref 98–110)
CHOL/HDLC RATIO: 3.1 (CALC)
CHOLESTEROL, TOTAL: 182 MG/DL
CREATININE: 1.01 MG/DL (ref 0.6–1.29)
EGFR: 95 ML/MIN/1.73M2
EOSINOPHILS: 18.7 %
GLOBULIN: 2.5 G/DL (CALC) (ref 1.9–3.7)
GLUCOSE: 90 MG/DL (ref 65–99)
HDL CHOLESTEROL: 58 MG/DL
HEMATOCRIT: 42.6 % (ref 38.5–50)
HEMOGLOBIN A1C: 5.8 %
HEMOGLOBIN: 14.1 G/DL (ref 13.2–17.1)
LDL-CHOLESTEROL: 111 MG/DL (CALC)
LYMPHOCYTES: 38.9 %
MCH: 29.7 PG (ref 27–33)
MCHC: 33.1 G/DL (ref 32–36)
MCV: 89.9 FL (ref 80–100)
MONOCYTES: 7 %
MPV: 10 FL (ref 7.5–12.5)
NEUTROPHILS: 33.7 %
NON-HDL CHOLESTEROL: 124 MG/DL (CALC)
PLATELET COUNT: 236 THOUSAND/UL (ref 140–400)
POTASSIUM: 4.4 MMOL/L (ref 3.5–5.3)
PROTEIN, TOTAL: 7 G/DL (ref 6.1–8.1)
RDW: 13.5 % (ref 11–15)
RED BLOOD CELL COUNT: 4.74 MILLION/UL (ref 4.2–5.8)
SODIUM: 136 MMOL/L (ref 135–146)
TRIGLYCERIDES: 50 MG/DL
TSH W/REFLEX TO FT4: 1.29 MIU/L (ref 0.4–4.5)
WHITE BLOOD CELL COUNT: 4.7 THOUSAND/UL (ref 3.8–10.8)

## 2025-05-13 ENCOUNTER — OFFICE VISIT (OUTPATIENT)
Dept: SURGERY | Facility: CLINIC | Age: 45
End: 2025-05-13
Payer: COMMERCIAL

## 2025-05-13 VITALS
DIASTOLIC BLOOD PRESSURE: 85 MMHG | WEIGHT: 145 LBS | HEART RATE: 62 BPM | SYSTOLIC BLOOD PRESSURE: 150 MMHG | BODY MASS INDEX: 21 KG/M2

## 2025-05-13 DIAGNOSIS — K40.90 NON-RECURRENT UNILATERAL INGUINAL HERNIA WITHOUT OBSTRUCTION OR GANGRENE: Primary | ICD-10-CM

## 2025-05-13 PROCEDURE — 3079F DIAST BP 80-89 MM HG: CPT | Performed by: SURGERY

## 2025-05-13 PROCEDURE — 99212 OFFICE O/P EST SF 10 MIN: CPT | Performed by: SURGERY

## 2025-05-13 PROCEDURE — 3077F SYST BP >= 140 MM HG: CPT | Performed by: SURGERY

## 2025-05-13 NOTE — PROGRESS NOTES
Chief Complaint   Patient presents with    Hernia     Patient here for a follow up on his left inguinal hernia.  States it has changed recently.  States it feels different.         O:  /85 (BP Location: Left arm, Patient Position: Sitting, Cuff Size: large)   Pulse 62   Wt 145 lb (65.8 kg)   BMI 21.41 kg/m²   GEN:  No acute distress  Abd:  Soft, NTND, reducible left groin bulge      Assessment   1. Non-recurrent unilateral inguinal hernia without obstruction or gangrene        No significant change from last exam.  Continue observation and will plan for an open left inguinal hernia repair with mesh if pt develops symptoms.           Alverto Cordova MD

## 2025-07-18 ENCOUNTER — OFFICE VISIT (OUTPATIENT)
Dept: INTERNAL MEDICINE CLINIC | Facility: CLINIC | Age: 45
End: 2025-07-18
Payer: COMMERCIAL

## 2025-07-18 VITALS
SYSTOLIC BLOOD PRESSURE: 124 MMHG | TEMPERATURE: 98 F | HEART RATE: 67 BPM | OXYGEN SATURATION: 97 % | HEIGHT: 69 IN | BODY MASS INDEX: 20.88 KG/M2 | WEIGHT: 141 LBS | DIASTOLIC BLOOD PRESSURE: 70 MMHG

## 2025-07-18 DIAGNOSIS — Z00.00 ADULT GENERAL MEDICAL EXAM: Primary | ICD-10-CM

## 2025-07-18 DIAGNOSIS — J45.20 MILD INTERMITTENT EXTRINSIC ASTHMA WITHOUT COMPLICATION (HCC): ICD-10-CM

## 2025-07-18 PROBLEM — J45.909 EXTRINSIC ASTHMA (HCC): Status: ACTIVE | Noted: 2025-07-18

## 2025-07-18 NOTE — PROGRESS NOTES
Cleveland Vick is a 45 year old male.  Chief Complaint   Patient presents with    Physical     Pt would like to discuss recent blood test results    Wheezing     HPI:   45-year-old gentleman here for physical.  Overall he reports that he is doing okay.  His mutation problem is slowly getting worse.  He continues to follow-up with the urinary [facility].    He reports that he is having allergic cough and occasional wheezing.  He is going to see an allergy consultant.      Past medical history none except mild ED he takes Viagra, mutation affecting gait-follows up with the [facility] (1. Biallelic mutation of SPG7 gene )     Past surgical history eye surgery.     He is not allergic to any medication.     He does not smoke, denies alcohol or recreational drug abuse.     Family history-his father had CABG at the age of 74.  He denies any prostate cancer or colon cancer in the family.     He is  and he has 2 kids. Works as an     Current Medications[1]   Past Medical History[2]   Past Surgical History[3]   Social History:  Short Social Hx on File[4]   Family History[5]   Allergies[6]     REVIEW OF SYSTEMS:   Review of Systems   Review of Systems   Constitutional: Negative for activity change, appetite change and fever.   HENT: Negative for congestion and voice change.    Respiratory: Negative for cough and shortness of breath.    Cardiovascular: Negative for chest pain.   Gastrointestinal: Negative for abdominal distention, abdominal pain and vomiting.   Genitourinary: Negative for hematuria.   Skin: Negative for wound.   Psychiatric/Behavioral: Negative for behavioral problems.  Gait abnormality present  Wt Readings from Last 5 Encounters:   07/18/25 141 lb (64 kg)   05/13/25 145 lb (65.8 kg)   05/01/25 145 lb (65.8 kg)   08/13/24 142 lb (64.4 kg)   05/20/24 142 lb 2 oz (64.5 kg)     Body mass index is 20.82 kg/m².      EXAM:   /70   Pulse 67   Temp 97.7 °F (36.5 °C) (Temporal)   Ht 5' 9\" (1.753  m)   Wt 141 lb (64 kg)   SpO2 97%   BMI 20.82 kg/m²   Physical Exam   Constitutional:       Appearance: Normal appearance.   HENT:      Head: Normocephalic.   Eyes:      Conjunctiva/sclera: Conjunctivae normal.   Cardiovascular:      Rate and Rhythm: Normal rate and regular rhythm.      Heart sounds: Normal heart sounds. No murmur heard.  Pulmonary:      Effort: Pulmonary effort is normal.      Breath sounds: Normal breath sounds. No rhonchi or rales.   Abdominal:      General: Bowel sounds are normal.      Palpations: Abdomen is soft.      Tenderness: There is no abdominal tenderness.   Musculoskeletal:      Cervical back: Neck supple.      Right lower leg: No edema.      Left lower leg: No edema.   Skin:     General: Skin is warm and dry.   Neurological:      General: No focal deficit present.      Mental Status: He is alert and oriented to person, place, and time. Mental status is at baseline.  Motor strength 5 out of 5 bilaterally upper extremity and lower extremity.  Gait abnormality present.  Psychiatric:         Mood and Affect: Mood normal.         Behavior: Behavior normal.       ASSESSMENT AND PLAN:   1. Adult general medical exam  Eat healthy with fruits and vegetables, exercise regularly as tolerated.  Continue to follow-up with the [facility] for gene mutation.  He will complete colonoscopy when he turns 46.  PSA is up-to-date.    2. Mild intermittent extrinsic asthma without complication (HCC)  Allergic asthma-continue montelukast, Xyzal and ProAir on an as needed basis.  He is going to see a consultant.    Plan: As above.      The patient indicates understanding of these issues and agrees to the plan.  No follow-ups on file.    This note was prepared using Dragon Medical voice recognition dictation software. As a result errors may occur. When identified these errors have been corrected. While every attempt is made to correct errors during dictation discrepancies may still exist.          [1]   Current Outpatient Medications   Medication Sig Dispense Refill    tadalafil 5 MG Oral Tab Take 1 tablet (5 mg total) by mouth daily as needed. (Patient not taking: Reported on 7/18/2025) 90 tablet 3   [2]   Past Medical History:   Ankle fracture    Casting 6 weeks    Chalazion of left upper eyelid    Chalazion right upper eyelid    Nasal septal deviation    Physical exam, annual    Routine general medical examination at a health care facility   [3]   Past Surgical History:  Procedure Laterality Date    Excision of chalazion, single - od - right eye Right 11/06/2019    RUL    Eye surgery  2008    Prk - od - right eye Right 2005    Prk - os - left eye Left 2005   [4]   Social History  Socioeconomic History    Marital status:    Tobacco Use    Smoking status: Never     Passive exposure: Never    Smokeless tobacco: Never   Vaping Use    Vaping status: Never Used   Substance and Sexual Activity    Alcohol use: No    Drug use: No   Other Topics Concern    Caffeine Concern Yes     Comment: 1 cup daily     Social Drivers of Health     Food Insecurity: No Food Insecurity (7/18/2025)    NCSS - Food Insecurity     Worried About Running Out of Food in the Last Year: No     Ran Out of Food in the Last Year: No   Transportation Needs: No Transportation Needs (7/18/2025)    NCSS - Transportation     Lack of Transportation: No   Housing Stability: Not At Risk (7/18/2025)    NCSS - Housing/Utilities     Has Housing: Yes     Worried About Losing Housing: No     Unable to Get Utilities: No   [5]   Family History  Problem Relation Age of Onset    Hypertension Father 72    Heart Disorder Father         CABG x 4    Other (cabg x 4) Father     Hypertension Mother 70    Stroke Mother 65        CVA    Other (Other) Brother         sleep apnea    Hypertension Brother     Diabetes Neg     Glaucoma Neg     Macular degeneration Neg    [6] No Known Allergies

## 2025-07-21 ENCOUNTER — APPOINTMENT (OUTPATIENT)
Dept: URBAN - METROPOLITAN AREA CLINIC 244 | Age: 45
Setting detail: DERMATOLOGY
End: 2025-07-24

## 2025-07-21 DIAGNOSIS — D485 NEOPLASM OF UNCERTAIN BEHAVIOR OF SKIN: ICD-10-CM

## 2025-07-21 DIAGNOSIS — D22 MELANOCYTIC NEVI: ICD-10-CM

## 2025-07-21 PROBLEM — D48.5 NEOPLASM OF UNCERTAIN BEHAVIOR OF SKIN: Status: ACTIVE | Noted: 2025-07-21

## 2025-07-21 PROBLEM — D22.39 MELANOCYTIC NEVI OF OTHER PARTS OF FACE: Status: ACTIVE | Noted: 2025-07-21

## 2025-07-21 PROCEDURE — OTHER COUNSELING: OTHER

## 2025-07-21 PROCEDURE — OTHER PHOTO-DOCUMENTATION: OTHER

## 2025-07-21 PROCEDURE — OTHER DIAGNOSIS COMMENT: OTHER

## 2025-07-21 PROCEDURE — 99213 OFFICE O/P EST LOW 20 MIN: CPT

## 2025-07-21 ASSESSMENT — LOCATION ZONE DERM: LOCATION ZONE: FACE

## 2025-07-21 ASSESSMENT — LOCATION DETAILED DESCRIPTION DERM
LOCATION DETAILED: RIGHT SUPERIOR LATERAL MALAR CHEEK
LOCATION DETAILED: RIGHT CENTRAL MALAR CHEEK

## 2025-07-21 ASSESSMENT — LOCATION SIMPLE DESCRIPTION DERM: LOCATION SIMPLE: RIGHT CHEEK

## 2025-07-24 ENCOUNTER — TELEPHONE (OUTPATIENT)
Dept: SURGERY | Facility: CLINIC | Age: 45
End: 2025-07-24

## 2025-07-24 NOTE — TELEPHONE ENCOUNTER
Detail Level: Detailed
Pt called to schedule surgery.  Please call pt   
Spoke to pt and he decided to schedule for oct 22 2025.   
Add 54070 Cpt? (Important Note: In 2017 The Use Of 65871 Is Being Tracked By Cms To Determine Future Global Period Reimbursement For Global Periods): yes
Wound Evaluated By: Toni Hair MD

## 2025-08-13 ENCOUNTER — OFFICE VISIT (OUTPATIENT)
Dept: OTOLARYNGOLOGY | Facility: CLINIC | Age: 45
End: 2025-08-13

## 2025-08-13 VITALS — BODY MASS INDEX: 20.88 KG/M2 | WEIGHT: 141 LBS | HEIGHT: 69 IN

## 2025-08-13 DIAGNOSIS — J34.2 NASAL SEPTAL DEVIATION: ICD-10-CM

## 2025-08-13 DIAGNOSIS — S02.2XXS CLOSED FRACTURE OF NASAL BONE, SEQUELA: ICD-10-CM

## 2025-08-13 DIAGNOSIS — G47.33 OBSTRUCTIVE SLEEP APNEA: Primary | ICD-10-CM

## 2025-08-13 PROCEDURE — 99203 OFFICE O/P NEW LOW 30 MIN: CPT | Performed by: OTOLARYNGOLOGY

## 2025-08-13 PROCEDURE — 3008F BODY MASS INDEX DOCD: CPT | Performed by: OTOLARYNGOLOGY

## 2025-08-28 ENCOUNTER — APPOINTMENT (OUTPATIENT)
Dept: URBAN - METROPOLITAN AREA CLINIC 244 | Age: 45
Setting detail: DERMATOLOGY
End: 2025-08-29

## 2025-08-28 DIAGNOSIS — L259 CONTACT DERMATITIS AND OTHER ECZEMA, UNSPECIFIED CAUSE: ICD-10-CM

## 2025-08-28 PROBLEM — L23.9 ALLERGIC CONTACT DERMATITIS, UNSPECIFIED CAUSE: Status: ACTIVE | Noted: 2025-08-28

## 2025-08-28 PROCEDURE — OTHER ADDITIONAL NOTES: OTHER

## 2025-08-28 PROCEDURE — OTHER PRESCRIPTION: OTHER

## 2025-08-28 PROCEDURE — OTHER COUNSELING: OTHER

## 2025-08-28 PROCEDURE — 99213 OFFICE O/P EST LOW 20 MIN: CPT

## 2025-08-28 RX ORDER — TRIAMCINOLONE ACETONIDE 1 MG/G
CREAM TOPICAL
Qty: 80 | Refills: 0 | Status: ERX | COMMUNITY
Start: 2025-08-28

## 2025-08-28 ASSESSMENT — LOCATION DETAILED DESCRIPTION DERM
LOCATION DETAILED: RIGHT DISTAL CALF
LOCATION DETAILED: LEFT DISTAL POSTERIOR UPPER ARM
LOCATION DETAILED: RIGHT DISTAL POSTERIOR UPPER ARM
LOCATION DETAILED: RIGHT DISTAL POSTERIOR UPPER ARM
LOCATION DETAILED: LEFT DISTAL CALF
LOCATION DETAILED: RIGHT DISTAL CALF
LOCATION DETAILED: LEFT DISTAL CALF
LOCATION DETAILED: LEFT DISTAL POSTERIOR UPPER ARM

## 2025-08-28 ASSESSMENT — LOCATION SIMPLE DESCRIPTION DERM
LOCATION SIMPLE: LEFT UPPER ARM
LOCATION SIMPLE: RIGHT UPPER ARM
LOCATION SIMPLE: LEFT CALF
LOCATION SIMPLE: LEFT UPPER ARM
LOCATION SIMPLE: RIGHT CALF
LOCATION SIMPLE: LEFT CALF
LOCATION SIMPLE: RIGHT UPPER ARM
LOCATION SIMPLE: RIGHT CALF

## 2025-08-28 ASSESSMENT — LOCATION ZONE DERM
LOCATION ZONE: ARM
LOCATION ZONE: ARM
LOCATION ZONE: LEG
LOCATION ZONE: LEG

## (undated) NOTE — LETTER
October 24, 2019    Kalee Aguila PA-C  [address]      Patient: Bigg Benitez   YOB: 1980   Date of Visit: 10/24/2019       Dear Dr. Corine Drake PA-C:    Thank you for referring Bigg Benitez to me for evaluation. Negative for: Constitutional, Gastrointestinal, Neurological, Skin, Genitourinary, Musculoskeletal, HENT, Endocrine, Cardiovascular, Respiratory, Psychiatric, Allergic/Imm, Heme/Lymph    Last edited by Ryanne Carroll O.T. on 10/24/2019  3:01 PM. (Histor If you have questions, please do not hesitate to call me. I look forward to following Hector Ravi along with you.     Sincerely,        Ibrahima Rodríguez MD        CC: Christy Tinsley MD    Document electronically generated by: Ibrahima Rodríguez

## (undated) NOTE — MR AVS SNAPSHOT
[address] [facility]  [phone]               Thank you for choosing us for your health care visit with Mario Rojas MD.  We are glad to serve you and happy to provide you with this summar You will stand on a platform that measures shifts in your body weight.   · Electrocochleography (ECoG) measures the fluid pressure in the inner ear. An abnormal ECoG may mean you have Meniere's disease or other conditions.   · Vestibular evoked myogenic pot Visits < Visit Summaries. MyChart questions? Call [phone] for help. [employer] is NOT to be used for urgent needs. For medical emergencies, dial 911.            Visit [facility] Hospital[facility] online at  [email or web address]

## (undated) NOTE — LETTER
[address] [facility] [place], [address] [facility] [address]  [address] [facility] [address] [phone]            Patient Information:  Patient Name:  Liang Polanco, ([id number]) Sex: male : 7/3/1980   _____________________________________________

## (undated) NOTE — LETTER
AUTHORIZATION FOR SURGICAL OPERATION OR OTHER PROCEDURE    1.  I hereby authorize Dr. Seb Michel, and [facility] staff assigned to my case to perform the following operation and/or procedure at the [facility]:    ________________________ Time:  ________ A. M.  P.M.        Patient Name:  ______________________________________________________  (please print)      Patient signature:  ___________________________________________________             Relationship to Patient: